# Patient Record
Sex: FEMALE | Race: OTHER | HISPANIC OR LATINO
[De-identification: names, ages, dates, MRNs, and addresses within clinical notes are randomized per-mention and may not be internally consistent; named-entity substitution may affect disease eponyms.]

---

## 2018-03-22 ENCOUNTER — APPOINTMENT (OUTPATIENT)
Dept: RADIOLOGY | Facility: CLINIC | Age: 53
End: 2018-03-22

## 2018-03-22 ENCOUNTER — OUTPATIENT (OUTPATIENT)
Dept: OUTPATIENT SERVICES | Facility: HOSPITAL | Age: 53
LOS: 1 days | End: 2018-03-22
Payer: COMMERCIAL

## 2018-03-22 ENCOUNTER — APPOINTMENT (OUTPATIENT)
Dept: MAMMOGRAPHY | Facility: CLINIC | Age: 53
End: 2018-03-22
Payer: COMMERCIAL

## 2018-03-22 DIAGNOSIS — Z98.89 OTHER SPECIFIED POSTPROCEDURAL STATES: Chronic | ICD-10-CM

## 2018-03-22 DIAGNOSIS — Z00.8 ENCOUNTER FOR OTHER GENERAL EXAMINATION: ICD-10-CM

## 2018-03-22 DIAGNOSIS — Z98.51 TUBAL LIGATION STATUS: Chronic | ICD-10-CM

## 2018-03-22 DIAGNOSIS — Z98.84 BARIATRIC SURGERY STATUS: Chronic | ICD-10-CM

## 2018-03-22 PROCEDURE — 77067 SCR MAMMO BI INCL CAD: CPT | Mod: 26

## 2018-03-22 PROCEDURE — 77063 BREAST TOMOSYNTHESIS BI: CPT

## 2018-03-22 PROCEDURE — 77080 DXA BONE DENSITY AXIAL: CPT

## 2018-03-22 PROCEDURE — 77080 DXA BONE DENSITY AXIAL: CPT | Mod: 26

## 2018-03-22 PROCEDURE — 77067 SCR MAMMO BI INCL CAD: CPT

## 2018-03-22 PROCEDURE — 77063 BREAST TOMOSYNTHESIS BI: CPT | Mod: 26

## 2018-04-13 ENCOUNTER — OUTPATIENT (OUTPATIENT)
Dept: OUTPATIENT SERVICES | Facility: HOSPITAL | Age: 53
LOS: 1 days | End: 2018-04-13
Payer: COMMERCIAL

## 2018-04-13 ENCOUNTER — APPOINTMENT (OUTPATIENT)
Dept: MAMMOGRAPHY | Facility: CLINIC | Age: 53
End: 2018-04-13
Payer: COMMERCIAL

## 2018-04-13 DIAGNOSIS — Z98.84 BARIATRIC SURGERY STATUS: Chronic | ICD-10-CM

## 2018-04-13 DIAGNOSIS — Z98.51 TUBAL LIGATION STATUS: Chronic | ICD-10-CM

## 2018-04-13 DIAGNOSIS — Z00.8 ENCOUNTER FOR OTHER GENERAL EXAMINATION: ICD-10-CM

## 2018-04-13 DIAGNOSIS — Z98.89 OTHER SPECIFIED POSTPROCEDURAL STATES: Chronic | ICD-10-CM

## 2018-04-13 PROCEDURE — 77065 DX MAMMO INCL CAD UNI: CPT

## 2018-04-13 PROCEDURE — 77065 DX MAMMO INCL CAD UNI: CPT | Mod: 26,RT

## 2018-04-20 ENCOUNTER — OUTPATIENT (OUTPATIENT)
Dept: OUTPATIENT SERVICES | Facility: HOSPITAL | Age: 53
LOS: 1 days | End: 2018-04-20
Payer: COMMERCIAL

## 2018-04-20 ENCOUNTER — APPOINTMENT (OUTPATIENT)
Dept: MAMMOGRAPHY | Facility: IMAGING CENTER | Age: 53
End: 2018-04-20
Payer: COMMERCIAL

## 2018-04-20 ENCOUNTER — RESULT REVIEW (OUTPATIENT)
Age: 53
End: 2018-04-20

## 2018-04-20 DIAGNOSIS — Z98.51 TUBAL LIGATION STATUS: Chronic | ICD-10-CM

## 2018-04-20 DIAGNOSIS — Z98.89 OTHER SPECIFIED POSTPROCEDURAL STATES: Chronic | ICD-10-CM

## 2018-04-20 DIAGNOSIS — R92.8 OTHER ABNORMAL AND INCONCLUSIVE FINDINGS ON DIAGNOSTIC IMAGING OF BREAST: ICD-10-CM

## 2018-04-20 DIAGNOSIS — Z98.84 BARIATRIC SURGERY STATUS: Chronic | ICD-10-CM

## 2018-04-20 PROCEDURE — 88305 TISSUE EXAM BY PATHOLOGIST: CPT | Mod: 26

## 2018-04-20 PROCEDURE — 77065 DX MAMMO INCL CAD UNI: CPT | Mod: 26,RT

## 2018-04-20 PROCEDURE — 88305 TISSUE EXAM BY PATHOLOGIST: CPT

## 2018-04-20 PROCEDURE — 19081 BX BREAST 1ST LESION STRTCTC: CPT

## 2018-04-20 PROCEDURE — 19081 BX BREAST 1ST LESION STRTCTC: CPT | Mod: RT

## 2018-04-20 PROCEDURE — A4648: CPT

## 2018-04-20 PROCEDURE — 77065 DX MAMMO INCL CAD UNI: CPT

## 2019-04-04 ENCOUNTER — APPOINTMENT (OUTPATIENT)
Dept: ULTRASOUND IMAGING | Facility: CLINIC | Age: 54
End: 2019-04-04

## 2019-04-04 ENCOUNTER — APPOINTMENT (OUTPATIENT)
Dept: MAMMOGRAPHY | Facility: CLINIC | Age: 54
End: 2019-04-04
Payer: COMMERCIAL

## 2019-04-04 ENCOUNTER — OUTPATIENT (OUTPATIENT)
Dept: OUTPATIENT SERVICES | Facility: HOSPITAL | Age: 54
LOS: 1 days | End: 2019-04-04
Payer: COMMERCIAL

## 2019-04-04 DIAGNOSIS — Z00.8 ENCOUNTER FOR OTHER GENERAL EXAMINATION: ICD-10-CM

## 2019-04-04 DIAGNOSIS — Z98.51 TUBAL LIGATION STATUS: Chronic | ICD-10-CM

## 2019-04-04 DIAGNOSIS — Z98.89 OTHER SPECIFIED POSTPROCEDURAL STATES: Chronic | ICD-10-CM

## 2019-04-04 DIAGNOSIS — Z98.84 BARIATRIC SURGERY STATUS: Chronic | ICD-10-CM

## 2019-04-04 PROCEDURE — G0279: CPT

## 2019-04-04 PROCEDURE — 76641 ULTRASOUND BREAST COMPLETE: CPT | Mod: 26,50

## 2019-04-04 PROCEDURE — 77066 DX MAMMO INCL CAD BI: CPT

## 2019-04-04 PROCEDURE — 76641 ULTRASOUND BREAST COMPLETE: CPT

## 2019-04-04 PROCEDURE — 77066 DX MAMMO INCL CAD BI: CPT | Mod: 26

## 2019-04-04 PROCEDURE — G0279: CPT | Mod: 26

## 2019-04-05 ENCOUNTER — TRANSCRIPTION ENCOUNTER (OUTPATIENT)
Age: 54
End: 2019-04-05

## 2019-05-31 ENCOUNTER — APPOINTMENT (OUTPATIENT)
Dept: GASTROENTEROLOGY | Facility: CLINIC | Age: 54
End: 2019-05-31
Payer: COMMERCIAL

## 2019-05-31 VITALS
SYSTOLIC BLOOD PRESSURE: 122 MMHG | WEIGHT: 205 LBS | HEART RATE: 75 BPM | TEMPERATURE: 98.6 F | DIASTOLIC BLOOD PRESSURE: 80 MMHG | OXYGEN SATURATION: 98 % | HEIGHT: 61 IN | BODY MASS INDEX: 38.71 KG/M2

## 2019-05-31 DIAGNOSIS — Z12.11 ENCOUNTER FOR SCREENING FOR MALIGNANT NEOPLASM OF COLON: ICD-10-CM

## 2019-05-31 PROCEDURE — 99204 OFFICE O/P NEW MOD 45 MIN: CPT

## 2019-05-31 NOTE — HISTORY OF PRESENT ILLNESS
[Heartburn] : stable heartburn [Nausea] : denies nausea [Vomiting] : denies vomiting [Diarrhea] : denies diarrhea [Constipation] : denies constipation [Yellow Skin Or Eyes (Jaundice)] : denies jaundice [Abdominal Pain] : denies abdominal pain [Abdominal Swelling] : denies abdominal swelling [Rectal Pain] : denies rectal pain [Wt Gain ___ Lbs] : recent [unfilled] ~Upound(s) weight gain [GERD] : gastroesophageal reflux disease [Cholelithiasis] : cholelithiasis [Abdominal Surgery] : abdominal surgery [Cholecystectomy] : cholecystectomy [Wt Loss ___ Lbs] : no recent weight loss [Hiatus Hernia] : no hiatus hernia [Peptic Ulcer Disease] : no peptic ulcer disease [Pancreatitis] : no pancreatitis [Kidney Stone] : no kidney stone [Inflammatory Bowel Disease] : no inflammatory bowel disease [Irritable Bowel Syndrome] : no irritable bowel syndrome [Diverticulitis] : no diverticulitis [Alcohol Abuse] : no alcohol abuse [Malignancy] : no malignancy [Appendectomy] : no appendectomy [de-identified] : 53 year old woman presents for a screening colonoscopy. This will be her first colonoscopy. She denies rectal bleeding, melena or hematemesis. She also complains of recent exacerbation of her heartburn and epigastric pain. This occurred after she recently received steroids for her arthritis which she discontinued. She is s/p gastric sleeve surgery for obesity. [de-identified] : laparoscopic sleeve surgery

## 2019-07-17 ENCOUNTER — RX RENEWAL (OUTPATIENT)
Age: 54
End: 2019-07-17

## 2019-07-17 ENCOUNTER — APPOINTMENT (OUTPATIENT)
Dept: GASTROENTEROLOGY | Facility: AMBULATORY MEDICAL SERVICES | Age: 54
End: 2019-07-17
Payer: COMMERCIAL

## 2019-07-17 PROCEDURE — 45380 COLONOSCOPY AND BIOPSY: CPT | Mod: 33

## 2019-07-17 PROCEDURE — 43239 EGD BIOPSY SINGLE/MULTIPLE: CPT | Mod: 59

## 2019-08-13 ENCOUNTER — APPOINTMENT (OUTPATIENT)
Dept: GASTROENTEROLOGY | Facility: CLINIC | Age: 54
End: 2019-08-13
Payer: COMMERCIAL

## 2019-08-13 VITALS
WEIGHT: 208 LBS | OXYGEN SATURATION: 99 % | SYSTOLIC BLOOD PRESSURE: 120 MMHG | DIASTOLIC BLOOD PRESSURE: 70 MMHG | TEMPERATURE: 98.1 F | RESPIRATION RATE: 17 BRPM | HEIGHT: 61 IN | BODY MASS INDEX: 39.27 KG/M2 | HEART RATE: 63 BPM

## 2019-08-13 DIAGNOSIS — K63.5 POLYP OF COLON: ICD-10-CM

## 2019-08-13 PROCEDURE — 99214 OFFICE O/P EST MOD 30 MIN: CPT

## 2019-08-13 RX ORDER — SODIUM SULFATE, POTASSIUM SULFATE, MAGNESIUM SULFATE 17.5; 3.13; 1.6 G/ML; G/ML; G/ML
17.5-3.13-1.6 SOLUTION, CONCENTRATE ORAL
Qty: 1 | Refills: 0 | Status: COMPLETED | COMMUNITY
Start: 2019-05-31 | End: 2019-08-13

## 2019-08-13 NOTE — HISTORY OF PRESENT ILLNESS
[de-identified] : 53 year old woman with GERD. She feels better with the Omeprazole. EGD showed reflux esophagitis and an hiatus hernia. She underwent a screening colonoscopy and had a small hyperplastic polyp removed from the descending colon. She is s/p gastric sleeve surgery.

## 2019-08-13 NOTE — PHYSICAL EXAM
[General Appearance - Alert] : alert [General Appearance - In No Acute Distress] : in no acute distress [Neck Appearance] : the appearance of the neck was normal [Neck Cervical Mass (___cm)] : no neck mass was observed [Thyroid Diffuse Enlargement] : the thyroid was not enlarged [Jugular Venous Distention Increased] : there was no jugular-venous distention [Thyroid Nodule] : there were no palpable thyroid nodules [Auscultation Breath Sounds / Voice Sounds] : lungs were clear to auscultation bilaterally [Heart Sounds] : normal S1 and S2 [Heart Sounds Gallop] : no gallops [Heart Rate And Rhythm] : heart rate was normal and rhythm regular [Murmurs] : no murmurs [Heart Sounds Pericardial Friction Rub] : no pericardial rub [Bowel Sounds] : normal bowel sounds [] : no hepato-splenomegaly [Abdomen Tenderness] : non-tender [Abdomen Soft] : soft [Abdomen Mass (___ Cm)] : no abdominal mass palpated [Cervical Lymph Nodes Enlarged Anterior Bilaterally] : anterior cervical [Cervical Lymph Nodes Enlarged Posterior Bilaterally] : posterior cervical [Supraclavicular Lymph Nodes Enlarged Bilaterally] : supraclavicular [No CVA Tenderness] : no ~M costovertebral angle tenderness [No Spinal Tenderness] : no spinal tenderness

## 2019-08-22 ENCOUNTER — APPOINTMENT (OUTPATIENT)
Dept: INTERNAL MEDICINE | Facility: CLINIC | Age: 54
End: 2019-08-22

## 2020-02-06 RX ORDER — OMEPRAZOLE 40 MG/1
40 CAPSULE, DELAYED RELEASE ORAL
Qty: 30 | Refills: 3 | Status: ACTIVE | COMMUNITY
Start: 2019-07-17 | End: 1900-01-01

## 2020-02-20 ENCOUNTER — APPOINTMENT (OUTPATIENT)
Dept: PLASTIC SURGERY | Facility: CLINIC | Age: 55
End: 2020-02-20
Payer: COMMERCIAL

## 2020-02-20 VITALS — BODY MASS INDEX: 37.17 KG/M2 | WEIGHT: 202 LBS | HEIGHT: 62 IN

## 2020-02-20 DIAGNOSIS — E65 LOCALIZED ADIPOSITY: ICD-10-CM

## 2020-02-20 DIAGNOSIS — M79.3 PANNICULITIS, UNSPECIFIED: ICD-10-CM

## 2020-02-20 DIAGNOSIS — E88.1 LIPODYSTROPHY, NOT ELSEWHERE CLASSIFIED: ICD-10-CM

## 2020-02-20 PROCEDURE — 99204 OFFICE O/P NEW MOD 45 MIN: CPT

## 2020-02-22 PROBLEM — E65 ABDOMINAL PANNUS: Status: ACTIVE | Noted: 2020-02-22

## 2020-02-22 PROBLEM — M79.3 PANNICULITIS: Status: ACTIVE | Noted: 2020-02-22

## 2020-02-22 PROBLEM — E88.1 LIPODYSTROPHY: Status: ACTIVE | Noted: 2020-02-22

## 2020-04-26 ENCOUNTER — MESSAGE (OUTPATIENT)
Age: 55
End: 2020-04-26

## 2020-07-23 ENCOUNTER — APPOINTMENT (OUTPATIENT)
Dept: MAMMOGRAPHY | Facility: CLINIC | Age: 55
End: 2020-07-23
Payer: COMMERCIAL

## 2020-07-23 ENCOUNTER — APPOINTMENT (OUTPATIENT)
Dept: ULTRASOUND IMAGING | Facility: CLINIC | Age: 55
End: 2020-07-23
Payer: COMMERCIAL

## 2020-07-23 ENCOUNTER — OUTPATIENT (OUTPATIENT)
Dept: OUTPATIENT SERVICES | Facility: HOSPITAL | Age: 55
LOS: 1 days | End: 2020-07-23
Payer: COMMERCIAL

## 2020-07-23 DIAGNOSIS — Z98.84 BARIATRIC SURGERY STATUS: Chronic | ICD-10-CM

## 2020-07-23 DIAGNOSIS — Z98.89 OTHER SPECIFIED POSTPROCEDURAL STATES: Chronic | ICD-10-CM

## 2020-07-23 DIAGNOSIS — Z98.51 TUBAL LIGATION STATUS: Chronic | ICD-10-CM

## 2020-07-23 DIAGNOSIS — Z12.31 ENCOUNTER FOR SCREENING MAMMOGRAM FOR MALIGNANT NEOPLASM OF BREAST: ICD-10-CM

## 2020-07-23 PROCEDURE — 77063 BREAST TOMOSYNTHESIS BI: CPT

## 2020-07-23 PROCEDURE — 77067 SCR MAMMO BI INCL CAD: CPT | Mod: 26

## 2020-07-23 PROCEDURE — 77063 BREAST TOMOSYNTHESIS BI: CPT | Mod: 26

## 2020-07-23 PROCEDURE — 77067 SCR MAMMO BI INCL CAD: CPT

## 2020-07-23 PROCEDURE — 76641 ULTRASOUND BREAST COMPLETE: CPT

## 2020-07-23 PROCEDURE — 76641 ULTRASOUND BREAST COMPLETE: CPT | Mod: 26,50

## 2020-12-21 PROBLEM — Z12.11 ENCOUNTER FOR SCREENING COLONOSCOPY: Status: RESOLVED | Noted: 2019-05-31 | Resolved: 2020-12-21

## 2021-02-17 ENCOUNTER — NON-APPOINTMENT (OUTPATIENT)
Age: 56
End: 2021-02-17

## 2021-03-03 ENCOUNTER — APPOINTMENT (OUTPATIENT)
Dept: BARIATRICS | Facility: CLINIC | Age: 56
End: 2021-03-03
Payer: COMMERCIAL

## 2021-03-03 VITALS
BODY MASS INDEX: 37.97 KG/M2 | DIASTOLIC BLOOD PRESSURE: 70 MMHG | TEMPERATURE: 97.4 F | HEART RATE: 91 BPM | SYSTOLIC BLOOD PRESSURE: 120 MMHG | HEIGHT: 62 IN | WEIGHT: 206.35 LBS | OXYGEN SATURATION: 99 %

## 2021-03-03 DIAGNOSIS — R63.5 ABNORMAL WEIGHT GAIN: ICD-10-CM

## 2021-03-03 DIAGNOSIS — K59.00 CONSTIPATION, UNSPECIFIED: ICD-10-CM

## 2021-03-03 DIAGNOSIS — R14.0 ABDOMINAL DISTENSION (GASEOUS): ICD-10-CM

## 2021-03-03 DIAGNOSIS — Z90.49 ACQUIRED ABSENCE OF OTHER SPECIFIED PARTS OF DIGESTIVE TRACT: ICD-10-CM

## 2021-03-03 PROCEDURE — 99214 OFFICE O/P EST MOD 30 MIN: CPT

## 2021-03-03 PROCEDURE — 99072 ADDL SUPL MATRL&STAF TM PHE: CPT

## 2021-03-03 RX ORDER — TERBINAFINE HYDROCHLORIDE 250 MG/1
250 TABLET ORAL
Refills: 0 | Status: ACTIVE | COMMUNITY

## 2021-03-04 NOTE — PHYSICAL EXAM
[Obese, well nourished, in no acute distress] : obese, well nourished, in no acute distress [Normal] : affect appropriate [de-identified] : normoactive bowel sounds, soft and non tender, no hepatosplenomegaly or masses appreciated.

## 2021-03-04 NOTE — REVIEW OF SYSTEMS
[Constipation] : constipation [Negative] : Heme/Lymph [Abdominal Pain] : abdominal pain [Reflux/Heartburn] : reflux/heartburn [Recent Change In Weight] : ~T recent weight change [Fever] : no fever [Chills] : no chills [Night Sweats] : no night sweats [Fatigue] : no fatigue [Dysphagia] : no dysphagia [Loss of Hearing] : no loss of hearing [Hoarseness] : no hoarseness [Chest Pain] : no chest pain [Palpitations] : no palpitations [Lower Ext Edema] : no lower extremity edema [Shortness Of Breath] : no shortness of breath [Wheezing] : no wheezing [Vomiting] : no vomiting [Diarrhea] : no diarrhea [Dysuria] : no dysuria [Incontinence] : no incontinence [Joint Pain] : no joint pain [Joint Stiffness] : no joint stiffness [Skin Rash] : no skin rash [Skin Wound] : no skin wound [FreeTextEntry2] : weight gain since last visit.  [FreeTextEntry7] : + gas / flatulence

## 2021-03-04 NOTE — ASSESSMENT
[FreeTextEntry1] : 55 year old F  s/p lap sleeve gastrectomy and intra- op EGD. Weight gain since last visit on 2/11/2021. Current weight is 206 lbs. Maladaptive eating patterns. History of Gas/ Flatulence / GERD symptoms\par \par \par Will continue multivitamins and continue protein and liquid intake as instructed.\par Call for any questions or concerns.\par \par Nutritional counseling has been provided. The patient is encouraged to remain calorie conscious and continue a low fat, low carbohydrate, protein focus diet. Pt encouraged to participate in a daily exercise regimen incorporating cardio and  strength training.\par \par Discussed and reviewed recent labs including vitamin levels  with patient. TSH - 0.04 Plan to follow up with Endocrinologist to discuss decreasing levothyroxine - currently taking 250 mcg per day.\par \par Most recent Upper EGD - Dr. Roberts 2019 - acute / chronic inflammation and intestinal metaplasia- may represent Fallon's Esophagus . \par \par Pt was  advised to follow up with GI 2 years after procedure ( 2021) Plan to schedule a follow up . Pt advised to follow a 3 meal a day regimen. Pt advised to continue daily PPI / start a daily probiotic / avoid dairy - cheese , milk / fried foods/ spicy foods. May try either Gas- X or Phazyme OTC.  \par \par Discussed and reviewed constipation remedies with patient. \par \par Plan to schedule a one on one telemedicine visit with Annmarie GARCIA.\par \par Return to office in 6-8 weeks or earlier if any concerns.\par \par I spent an extensive amount of time with patient prior to visit  and after visit reviewing chart. \par

## 2021-03-04 NOTE — HISTORY OF PRESENT ILLNESS
[Procedure: ___] : Procedure performed: [unfilled]  [Date of Surgery: ___] : Date of Surgery:   [unfilled] [Surgeon Name:   ___] : Surgeon Name: Dr. DAMON [Pre-Op Weight ___] : Pre-op weight was [unfilled] lbs [de-identified] : 55 year old F  s/p lap sleeve gastrectomy and intra- op EGD. Weight gain since last visit on 2/11/2021. Current weight is 206 lbs. Pt is here to get back on track - history of maladaptive eating patterns - consuming sweets, skipping breakfast regularly, snacking between meals,consuming liquid calories. Pt has reports onset of  gas and flatulence this past Summer. Pt states she does consume fried foods/ dairy - cheese and milk during the week. Occasional nausea. No vomiting.  No issues with textured foods.  Consuming 3 meals per day. Patient is taking vitamin supplements as directed.  + constipation. No diarrhea . Occasional reflux symptoms. 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 Pt is planning to restart exercising.  [de-identified] : LAPAROSCOPIC CHOLECYSTECTOMY         DOS : 10/28/2018           SURGEON : DR PENNY

## 2021-03-19 ENCOUNTER — APPOINTMENT (OUTPATIENT)
Dept: GASTROENTEROLOGY | Facility: CLINIC | Age: 56
End: 2021-03-19

## 2021-03-19 LAB
25(OH)D3 SERPL-MCNC: 37.5 NG/ML
ALBUMIN SERPL ELPH-MCNC: 4.2 G/DL
ALP BLD-CCNC: 128 U/L
ALT SERPL-CCNC: 12 U/L
ANION GAP SERPL CALC-SCNC: 14 MMOL/L
AST SERPL-CCNC: 18 U/L
BASOPHILS # BLD AUTO: 0.05 K/UL
BASOPHILS NFR BLD AUTO: 0.9 %
BILIRUB SERPL-MCNC: 0.4 MG/DL
BUN SERPL-MCNC: 17 MG/DL
CALCIUM SERPL-MCNC: 9.9 MG/DL
CHLORIDE SERPL-SCNC: 106 MMOL/L
CHOLEST SERPL-MCNC: 161 MG/DL
CO2 SERPL-SCNC: 22 MMOL/L
CREAT SERPL-MCNC: 0.7 MG/DL
EOSINOPHIL # BLD AUTO: 0.09 K/UL
EOSINOPHIL NFR BLD AUTO: 1.5 %
ESTIMATED AVERAGE GLUCOSE: 108 MG/DL
FERRITIN SERPL-MCNC: 64 NG/ML
FOLATE SERPL-MCNC: 5.2 NG/ML
GLUCOSE SERPL-MCNC: 79 MG/DL
HBA1C MFR BLD HPLC: 5.4 %
HCT VFR BLD CALC: 40.3 %
HDLC SERPL-MCNC: 55 MG/DL
HGB BLD-MCNC: 12.9 G/DL
IMM GRANULOCYTES NFR BLD AUTO: 0.2 %
IRON SATN MFR SERPL: 28 %
IRON SERPL-MCNC: 94 UG/DL
LDLC SERPL CALC-MCNC: 85 MG/DL
LYMPHOCYTES # BLD AUTO: 1.77 K/UL
LYMPHOCYTES NFR BLD AUTO: 30.3 %
MAN DIFF?: NORMAL
MCHC RBC-ENTMCNC: 29.4 PG
MCHC RBC-ENTMCNC: 32 GM/DL
MCV RBC AUTO: 91.8 FL
MONOCYTES # BLD AUTO: 0.71 K/UL
MONOCYTES NFR BLD AUTO: 12.2 %
NEUTROPHILS # BLD AUTO: 3.21 K/UL
NEUTROPHILS NFR BLD AUTO: 54.9 %
NONHDLC SERPL-MCNC: 106 MG/DL
PLATELET # BLD AUTO: 318 K/UL
POTASSIUM SERPL-SCNC: 4.4 MMOL/L
PROT SERPL-MCNC: 7.6 G/DL
RBC # BLD: 4.39 M/UL
RBC # FLD: 13 %
SODIUM SERPL-SCNC: 141 MMOL/L
T4 FREE SERPL-MCNC: 1.9 NG/DL
TIBC SERPL-MCNC: 338 UG/DL
TRIGL SERPL-MCNC: 106 MG/DL
TSH SERPL-ACNC: 0.04 UIU/ML
UIBC SERPL-MCNC: 244 UG/DL
VIT A SERPL-MCNC: 39.5 UG/DL
VIT B1 SERPL-MCNC: 117.9 NMOL/L
VIT B12 SERPL-MCNC: 403 PG/ML
VIT B2 SERPL-MCNC: 210 UG/L
VIT B6 SERPL-MCNC: 5.9 UG/L
WBC # FLD AUTO: 5.84 K/UL
ZINC SERPL-MCNC: 97 UG/DL

## 2021-04-11 ENCOUNTER — TRANSCRIPTION ENCOUNTER (OUTPATIENT)
Age: 56
End: 2021-04-11

## 2021-04-21 ENCOUNTER — TRANSCRIPTION ENCOUNTER (OUTPATIENT)
Age: 56
End: 2021-04-21

## 2021-06-01 ENCOUNTER — APPOINTMENT (OUTPATIENT)
Dept: ENDOCRINOLOGY | Facility: CLINIC | Age: 56
End: 2021-06-01
Payer: COMMERCIAL

## 2021-06-01 VITALS
SYSTOLIC BLOOD PRESSURE: 143 MMHG | HEART RATE: 85 BPM | WEIGHT: 205 LBS | OXYGEN SATURATION: 98 % | BODY MASS INDEX: 37.73 KG/M2 | DIASTOLIC BLOOD PRESSURE: 86 MMHG | HEIGHT: 62 IN

## 2021-06-01 PROCEDURE — 99072 ADDL SUPL MATRL&STAF TM PHE: CPT

## 2021-06-01 PROCEDURE — 99204 OFFICE O/P NEW MOD 45 MIN: CPT

## 2021-06-22 ENCOUNTER — APPOINTMENT (OUTPATIENT)
Dept: GASTROENTEROLOGY | Facility: CLINIC | Age: 56
End: 2021-06-22

## 2021-06-30 ENCOUNTER — APPOINTMENT (OUTPATIENT)
Dept: BARIATRICS | Facility: CLINIC | Age: 56
End: 2021-06-30

## 2021-07-17 ENCOUNTER — LABORATORY RESULT (OUTPATIENT)
Age: 56
End: 2021-07-17

## 2021-08-10 ENCOUNTER — TRANSCRIPTION ENCOUNTER (OUTPATIENT)
Age: 56
End: 2021-08-10

## 2021-09-22 ENCOUNTER — RX RENEWAL (OUTPATIENT)
Age: 56
End: 2021-09-22

## 2021-10-18 ENCOUNTER — RX RENEWAL (OUTPATIENT)
Age: 56
End: 2021-10-18

## 2021-11-10 ENCOUNTER — OUTPATIENT (OUTPATIENT)
Dept: OUTPATIENT SERVICES | Facility: HOSPITAL | Age: 56
LOS: 1 days | End: 2021-11-10
Payer: COMMERCIAL

## 2021-11-10 ENCOUNTER — APPOINTMENT (OUTPATIENT)
Dept: RADIOLOGY | Facility: CLINIC | Age: 56
End: 2021-11-10
Payer: COMMERCIAL

## 2021-11-10 ENCOUNTER — APPOINTMENT (OUTPATIENT)
Dept: ULTRASOUND IMAGING | Facility: CLINIC | Age: 56
End: 2021-11-10
Payer: COMMERCIAL

## 2021-11-10 ENCOUNTER — APPOINTMENT (OUTPATIENT)
Dept: MAMMOGRAPHY | Facility: CLINIC | Age: 56
End: 2021-11-10
Payer: COMMERCIAL

## 2021-11-10 DIAGNOSIS — Z98.51 TUBAL LIGATION STATUS: Chronic | ICD-10-CM

## 2021-11-10 DIAGNOSIS — Z98.84 BARIATRIC SURGERY STATUS: Chronic | ICD-10-CM

## 2021-11-10 DIAGNOSIS — Z98.89 OTHER SPECIFIED POSTPROCEDURAL STATES: Chronic | ICD-10-CM

## 2021-11-10 DIAGNOSIS — Z13.820 ENCOUNTER FOR SCREENING FOR OSTEOPOROSIS: ICD-10-CM

## 2021-11-10 PROCEDURE — 76641 ULTRASOUND BREAST COMPLETE: CPT | Mod: 26,50

## 2021-11-10 PROCEDURE — 77063 BREAST TOMOSYNTHESIS BI: CPT

## 2021-11-10 PROCEDURE — 77080 DXA BONE DENSITY AXIAL: CPT | Mod: 26

## 2021-11-10 PROCEDURE — 76641 ULTRASOUND BREAST COMPLETE: CPT

## 2021-11-10 PROCEDURE — 77063 BREAST TOMOSYNTHESIS BI: CPT | Mod: 26

## 2021-11-10 PROCEDURE — 77067 SCR MAMMO BI INCL CAD: CPT | Mod: 26

## 2021-11-10 PROCEDURE — 77080 DXA BONE DENSITY AXIAL: CPT

## 2021-11-10 PROCEDURE — 77067 SCR MAMMO BI INCL CAD: CPT

## 2021-11-17 ENCOUNTER — RX RENEWAL (OUTPATIENT)
Age: 56
End: 2021-11-17

## 2021-11-19 ENCOUNTER — LABORATORY RESULT (OUTPATIENT)
Age: 56
End: 2021-11-19

## 2021-11-19 ENCOUNTER — APPOINTMENT (OUTPATIENT)
Dept: ENDOCRINOLOGY | Facility: CLINIC | Age: 56
End: 2021-11-19
Payer: COMMERCIAL

## 2021-11-19 VITALS
WEIGHT: 205 LBS | HEART RATE: 88 BPM | SYSTOLIC BLOOD PRESSURE: 130 MMHG | DIASTOLIC BLOOD PRESSURE: 89 MMHG | OXYGEN SATURATION: 98 % | BODY MASS INDEX: 37.73 KG/M2 | HEIGHT: 62 IN

## 2021-11-19 PROCEDURE — 99214 OFFICE O/P EST MOD 30 MIN: CPT | Mod: 25

## 2021-11-19 PROCEDURE — 36415 COLL VENOUS BLD VENIPUNCTURE: CPT

## 2021-12-03 ENCOUNTER — APPOINTMENT (OUTPATIENT)
Dept: ENDOCRINOLOGY | Facility: CLINIC | Age: 56
End: 2021-12-03
Payer: COMMERCIAL

## 2021-12-03 LAB
25(OH)D3 SERPL-MCNC: 33.5 NG/ML
ALBUMIN MFR SERPL ELPH: 51.3 %
ALBUMIN SERPL-MCNC: 3.8 G/DL
ALBUMIN/GLOB SERPL: 1 RATIO
ALBUPE: 14.5 %
ALPHA1 GLOB MFR SERPL ELPH: 4.2 %
ALPHA1 GLOB SERPL ELPH-MCNC: 0.3 G/DL
ALPHA1UPE: 37.1 %
ALPHA2 GLOB MFR SERPL ELPH: 10.7 %
ALPHA2 GLOB SERPL ELPH-MCNC: 0.8 G/DL
ALPHA2UPE: 23.7 %
B-GLOBULIN MFR SERPL ELPH: 15.6 %
B-GLOBULIN SERPL ELPH-MCNC: 1.2 G/DL
BETAUPE: 16.2 %
CALCIUM SERPL-MCNC: 9.3 MG/DL
CELIAC DISEASE INTERPRETATION: NORMAL
CELIAC GENE PAIRS PRESENT: YES
COLLAGEN CTX SERPL-MCNC: 380 PG/ML
COLLAGEN NTX UR-SCNC: 1839 NMOL BCE
COLLAGEN NTX/CREAT UR-SRTO: 200
CREAT 24H UR-MCNC: NORMAL G/24 H
CREAT UR-MCNC: 104 MG/DL
CREATININE UR (MAYO): 115 MG/DL
DQ ALPHA 1: NORMAL
DQ BETA 1: NORMAL
GAMMA GLOB FLD ELPH-MCNC: 1.4 G/DL
GAMMA GLOB MFR SERPL ELPH: 18.2 %
GAMMAUPE: 8.5 %
IGA 24H UR QL IFE: NORMAL
IMMUNOGLOBULIN A (IGA): 522 MG/DL
INTERPRETATION SERPL IEP-IMP: NORMAL
INTERPRETIVE GUIDE:: NORMAL
KAPPA LC 24H UR QL: NORMAL
PARATHYROID HORMONE INTACT: 40 PG/ML
PROT PATTERN 24H UR ELPH-IMP: NORMAL
PROT SERPL-MCNC: 7.5 G/DL
PROT SERPL-MCNC: 7.5 G/DL
PROT UR-MCNC: 9 MG/DL
PROT UR-MCNC: 9 MG/DL
T4 FREE SERPL-MCNC: 2.4 NG/DL
TSH SERPL-ACNC: 0.15 UIU/ML
VIT B12 SERPL-MCNC: 465 PG/ML

## 2021-12-03 PROCEDURE — 99442: CPT

## 2021-12-03 RX ORDER — LEVOTHYROXINE SODIUM 0.2 MG/1
200 TABLET ORAL
Qty: 30 | Refills: 0 | Status: DISCONTINUED | COMMUNITY
Start: 2021-06-01 | End: 2021-12-03

## 2021-12-19 ENCOUNTER — RX RENEWAL (OUTPATIENT)
Age: 56
End: 2021-12-19

## 2022-01-19 ENCOUNTER — TRANSCRIPTION ENCOUNTER (OUTPATIENT)
Age: 57
End: 2022-01-19

## 2022-03-18 ENCOUNTER — LABORATORY RESULT (OUTPATIENT)
Age: 57
End: 2022-03-18

## 2022-03-25 ENCOUNTER — APPOINTMENT (OUTPATIENT)
Dept: ENDOCRINOLOGY | Facility: CLINIC | Age: 57
End: 2022-03-25
Payer: COMMERCIAL

## 2022-03-25 VITALS
DIASTOLIC BLOOD PRESSURE: 83 MMHG | WEIGHT: 216 LBS | BODY MASS INDEX: 39.51 KG/M2 | OXYGEN SATURATION: 98 % | HEART RATE: 75 BPM | SYSTOLIC BLOOD PRESSURE: 122 MMHG

## 2022-03-25 PROCEDURE — 99214 OFFICE O/P EST MOD 30 MIN: CPT

## 2022-03-25 NOTE — PHYSICAL EXAM
[Alert] : alert [Well Nourished] : well nourished [Obese] : obese [No Acute Distress] : no acute distress [EOMI] : extra ocular movement intact [No Proptosis] : no proptosis [No Lid Lag] : no lid lag [Normal Hearing] : hearing was normal [Thyroid Not Enlarged] : the thyroid was not enlarged [No Respiratory Distress] : no respiratory distress [No Accessory Muscle Use] : no accessory muscle use [Normal Rate] : heart rate was normal [No Stigmata of Cushings Syndrome] : no stigmata of Cushings Syndrome [Normal Gait] : normal gait [No Rash] : no rash [No Motor Deficits] : the motor exam was normal [No Tremors] : no tremors [Oriented x3] : oriented to person, place, and time [Normal Affect] : the affect was normal [Normal Insight/Judgement] : insight and judgment were intact [Normal Mood] : the mood was normal [de-identified] : BMI 39

## 2022-03-25 NOTE — HISTORY OF PRESENT ILLNESS
[FreeTextEntry1] : Ms. Molina is presenting for follow up care for her hypothyroidism and osteoporosis.  \par \par 56 year old F with PMH of s/p lap sleeve gastrectomy in 2014 arthritis, hypothyroidism and osteoporosis. 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 Pt was previously 275mcg pre bariatric surgery, now on 200mcg. No MI or stroke. Pre op weight 306, lowest weight was 177lbs in feb 2016 and then pt starting eating poorly. Pt is weight stable at 205lbs. She also stopped exercising. ROS negative diarrhea, increased bowel movements, blurry vision, heat intolerance, palpitations, anxiety. \par TSH level in feb 2021 was low, 0.04 with a FT4 of 1.9. Repeat August improved to TSH 0.24, FT4 1.8. Patient was supposed to repeat labs after 6 weeks but notes she forgot. \par Today TSH 5.7 (increased from 0.15) with dose decreased from 200mcg to 150mcg. \par No h/o thyroid sonogram. \par \par #Osteoporosis \par Fracture history: None\par Family history: No parental history of hip fracture\par Prior Treatment: None \par Prior HRT: None \par Osteoporosis diagnosed in Nov 2021 on routine bone density significant for T-scores of -1.0 at the lumbar spine, -2.5 at the femoral neck\par Bone scan 2018: Femoral neck: -1.4, Total hip: -0.7, Spine: 0.0. \par Patient currently on fosamax weekly Started Dec 2021. Tolerating well. \par Vit D wnl, calcium wnl

## 2022-03-25 NOTE — DATA REVIEWED
[FreeTextEntry1] : All pertinent labs and imaging reviewed from patient's chart. \par \par EXAM:  XR BONE DENSITY AXIAL  \par PROCEDURE DATE:  11/10/2021  \par INTERPRETATION:  Clinical indication: Osteopenia. Screening for osteoporosis.\par \par Thank you for referring your patient for bone densitometry on the Lunar [employer] machine.  The results are expressed as a T-score, or the standard deviation from the mean young normal value, which is the basis for the WHO diagnostic criteria for bone density. Please note that the criteria have not been validated for males or premenopausal females.\par \par COMPARISON: Bone density dated 11/10/2021.\par \par RESULTS:\par \par Spine: -1.0, Normal; -10.2% is compared with 2018.\par Femoral neck: -2.5 , osteoporosis.\par Total hip: -1.7 , osteopenia; -13.2% is compared with 2018.\par \par IMPRESSION: Osteoporosis.\par \par FRACTURE RISK: The risk of fracture is above average.\par \par TREATMENT RECOMMENDATIONS:  Based on NOF treatment guidelines medical therapy is recommended at this time.\par \par \par EXAM:  XR BONE DENSITY AXIAL PROCEDURE DATE:  03/22/2018  \par COMPARISON: None.\par \par RESULTS:\par Femoral neck: -1.4,    .\par Total hip: -0.7,    .\par Spine: 0.0, \par \par The 10 year probability of an osteoporotic fracture is 2.2%\par The 10 year probability hip fracture 0.2%.\par \par IMPRESSION: \par Osteopenia

## 2022-03-25 NOTE — ASSESSMENT
[Weight Loss] : weight loss [Bisphosphonates] : The patient was instructed to take bisphosphonates on an empty stomach with a full glass of water,and wait at least 30 minutes before eating or lying down [Levothyroxine] : The patient was instructed to take Levothyroxine on an empty stomach, separate from vitamins, and wait at least 30 minutes before eating [FreeTextEntry1] : 56 year old F s/p lap sleeve gastrectomy in 2014 presenting for follow up of her hypothyroidism and newly diagnosed osteoporosis. \par \par 1. Hypothyroidism \par TSH now 5.70, FT4 1.4 March 2022 on LT4 150mcg. \par Increase to 2 tabs on sunday. \par Repeat labs in 6 weeks. \par \par 2. Osteoporosis. \par She has no history of fragility fracture. Her bone density is -2.5 at hip and she has 2 risk factors for fracture.\par Continue weekly Fosamax (started Dec 2021)\par \par Patient verbalized understanding of the above. All questions were answered to patient's satisfaction.\par Dispo: Patient will follow up in 6 months. Discuss labs over the phone in 6 weeks.

## 2022-06-29 ENCOUNTER — NON-APPOINTMENT (OUTPATIENT)
Age: 57
End: 2022-06-29

## 2022-07-07 ENCOUNTER — RX RENEWAL (OUTPATIENT)
Age: 57
End: 2022-07-07

## 2022-07-26 ENCOUNTER — RX RENEWAL (OUTPATIENT)
Age: 57
End: 2022-07-26

## 2022-09-13 ENCOUNTER — RX RENEWAL (OUTPATIENT)
Age: 57
End: 2022-09-13

## 2022-10-05 ENCOUNTER — RX RENEWAL (OUTPATIENT)
Age: 57
End: 2022-10-05

## 2022-11-10 ENCOUNTER — RESULT REVIEW (OUTPATIENT)
Age: 57
End: 2022-11-10

## 2022-11-16 ENCOUNTER — APPOINTMENT (OUTPATIENT)
Dept: MAMMOGRAPHY | Facility: CLINIC | Age: 57
End: 2022-11-16

## 2022-11-16 ENCOUNTER — OUTPATIENT (OUTPATIENT)
Dept: OUTPATIENT SERVICES | Facility: HOSPITAL | Age: 57
LOS: 1 days | End: 2022-11-16
Payer: COMMERCIAL

## 2022-11-16 ENCOUNTER — APPOINTMENT (OUTPATIENT)
Dept: ULTRASOUND IMAGING | Facility: CLINIC | Age: 57
End: 2022-11-16

## 2022-11-16 DIAGNOSIS — Z98.51 TUBAL LIGATION STATUS: Chronic | ICD-10-CM

## 2022-11-16 DIAGNOSIS — Z00.8 ENCOUNTER FOR OTHER GENERAL EXAMINATION: ICD-10-CM

## 2022-11-16 DIAGNOSIS — Z98.89 OTHER SPECIFIED POSTPROCEDURAL STATES: Chronic | ICD-10-CM

## 2022-11-16 DIAGNOSIS — Z98.84 BARIATRIC SURGERY STATUS: Chronic | ICD-10-CM

## 2022-11-16 PROCEDURE — 76641 ULTRASOUND BREAST COMPLETE: CPT | Mod: 26,50

## 2022-11-16 PROCEDURE — 77063 BREAST TOMOSYNTHESIS BI: CPT

## 2022-11-16 PROCEDURE — 77063 BREAST TOMOSYNTHESIS BI: CPT | Mod: 26

## 2022-11-16 PROCEDURE — 76641 ULTRASOUND BREAST COMPLETE: CPT

## 2022-11-16 PROCEDURE — 77067 SCR MAMMO BI INCL CAD: CPT | Mod: 26

## 2022-11-16 PROCEDURE — 77067 SCR MAMMO BI INCL CAD: CPT

## 2022-12-01 ENCOUNTER — LABORATORY RESULT (OUTPATIENT)
Age: 57
End: 2022-12-01

## 2022-12-05 ENCOUNTER — APPOINTMENT (OUTPATIENT)
Dept: ENDOCRINOLOGY | Facility: CLINIC | Age: 57
End: 2022-12-05

## 2022-12-05 VITALS
DIASTOLIC BLOOD PRESSURE: 81 MMHG | SYSTOLIC BLOOD PRESSURE: 136 MMHG | HEIGHT: 62 IN | BODY MASS INDEX: 41.41 KG/M2 | HEART RATE: 83 BPM | OXYGEN SATURATION: 99 % | WEIGHT: 225 LBS

## 2022-12-05 PROCEDURE — 99214 OFFICE O/P EST MOD 30 MIN: CPT

## 2022-12-06 NOTE — HISTORY OF PRESENT ILLNESS
[FreeTextEntry1] : Ms. Molina is presenting for follow up care for her hypothyroidism and osteoporosis.  \par \par 57 year old F with PMH of s/p lap sleeve gastrectomy in 2014 arthritis, hypothyroidism and osteoporosis. 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 Pt was previously 275mcg pre bariatric surgery, now on 200mcg. No MI or stroke. Pre op weight 306, lowest weight was 177lbs in feb 2016 and then pt starting eating poorly. Pt is weight stable at 205lbs. She also stopped exercising. ROS negative diarrhea, increased bowel movements, blurry vision, heat intolerance, palpitations, anxiety. \par TSH level in feb 2021 was low, 0.04 with a FT4 of 1.9. Repeat August improved to TSH 0.24, FT4 1.8. \par Today TSH 5.7 (increased from 0.15) with dose decreased from 200mcg to 150mcg QD. \par No h/o thyroid sonogram. \par Dec 2022 TSH 0.41 FT4 1.7 on LT4 150mcg 8x/week \par \par #Osteoporosis \par Fracture history: None\par Family history: No parental history of hip fracture\par Prior Treatment: None \par Prior HRT: None \par Osteoporosis diagnosed in Nov 2021 on routine bone density significant for T-scores of -1.0 at the lumbar spine, -2.5 at the femoral neck\par Bone scan 2018: Femoral neck: -1.4, Total hip: -0.7, Spine: 0.0. \par Patient currently on fosamax weekly Started Dec 2021. Tolerating well. \par Vit D wnl, calcium wnl

## 2022-12-06 NOTE — PHYSICAL EXAM
[Alert] : alert [Well Nourished] : well nourished [Obese] : obese [No Acute Distress] : no acute distress [EOMI] : extra ocular movement intact [No Proptosis] : no proptosis [No Lid Lag] : no lid lag [Normal Hearing] : hearing was normal [Thyroid Not Enlarged] : the thyroid was not enlarged [No Respiratory Distress] : no respiratory distress [No Accessory Muscle Use] : no accessory muscle use [Normal Rate] : heart rate was normal [No Stigmata of Cushings Syndrome] : no stigmata of Cushings Syndrome [Normal Gait] : normal gait [No Rash] : no rash [No Motor Deficits] : the motor exam was normal [No Tremors] : no tremors [Oriented x3] : oriented to person, place, and time [Normal Affect] : the affect was normal [Normal Insight/Judgement] : insight and judgment were intact [Normal Mood] : the mood was normal [de-identified] : BMI 39

## 2022-12-06 NOTE — ASSESSMENT
[Weight Loss] : weight loss [Bisphosphonates] : The patient was instructed to take bisphosphonates on an empty stomach with a full glass of water,and wait at least 30 minutes before eating or lying down [Levothyroxine] : The patient was instructed to take Levothyroxine on an empty stomach, separate from vitamins, and wait at least 30 minutes before eating [FreeTextEntry1] : 57 year old F s/p lap sleeve gastrectomy in 2014 presenting for follow up of her hypothyroidism and Osteoporosis. \par \par 1. Hypothyroidism \par TSH now 0.41,  FT4 1.7 Dec 2022\par Decrease to LT4 150mcg 1.5 tabs on sunday, 1 tab Mon thru sat, 1.5 tab on sunday. \par Repeat labs in 6 months. \par \par 2. Osteoporosis. \par She has no history of fragility fracture. Her bone density is -2.5 at hip and she has 2 risk factors for fracture.\par Continue weekly Fosamax (started Dec 2021)\par DEXA scan due Nov 2023\par \par Patient verbalized understanding of the above. All questions were answered to patient's satisfaction.\par Dispo: Patient will follow up in 6 months.

## 2023-01-11 ENCOUNTER — RX RENEWAL (OUTPATIENT)
Age: 58
End: 2023-01-11

## 2023-01-30 ENCOUNTER — APPOINTMENT (OUTPATIENT)
Dept: BARIATRICS/WEIGHT MGMT | Facility: CLINIC | Age: 58
End: 2023-01-30
Payer: COMMERCIAL

## 2023-01-30 VITALS — HEIGHT: 62 IN | BODY MASS INDEX: 43.24 KG/M2 | WEIGHT: 235 LBS

## 2023-01-30 DIAGNOSIS — E66.9 OBESITY, UNSPECIFIED: ICD-10-CM

## 2023-01-30 DIAGNOSIS — J45.909 UNSPECIFIED ASTHMA, UNCOMPLICATED: ICD-10-CM

## 2023-01-30 PROCEDURE — 99205 OFFICE O/P NEW HI 60 MIN: CPT | Mod: 95

## 2023-01-30 NOTE — REASON FOR VISIT
[Initial Evaluation] : an initial evaluation [Home] : at home, [unfilled] , at the time of the visit. [Medical Office: ([facility])___] : at the medical office located in

## 2023-01-30 NOTE — HISTORY OF PRESENT ILLNESS
[FreeTextEntry1] : Bariatric surgery history: sleeve 2014 eGma\par Obesity co-morbidities: hypothyroidism, arthritis, allergies - with pet dander\par Comorbidities improved or resolved: none\par Anti-obesity medications: none\par Obesity medication side effects: none\par \par PATIENT WAS NOTIFIED THAT ANYTHING WE DISCUSSED IN OUR MEETING TODAY MAY BE REFLECTED IN WRITING IN THE VISIT NOTE WHICH WILL BE AVAILABLE TO OTHER MEDICAL PROVIDERS TO REVIEW AS PART OF ROUTINE PATIENT CARE.  PATIENT VERBALLY AGREED. \par \par Ms. FELIPA ZELAYA is a 57 year year old female who presents for evaluation and treatment of Class 3 obesity. \par \par Obesity related co- morbidities: hypothyroidism, arthritis  - in knees, back, asthma\par Arthritis - worse with weight gain. 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  +stress eating as well.   Take out 4-5 times a week. \par \par Obesity began in teens.  Weight gain has occurred with:  In 20s up to 250, and 30s up to 300, then after keshawn surgery down to 175, now - at that time went to the gym.  Then covid hit - switched to working from home and \par then gradually increased.  With 1st pregnancy had thyroid issues, and after that weight continued to go up.  \par \par Past weight loss attempts include:  sleeve, WW, RDN. These have produced a maximum of 75 pounds of weight loss.  \par Anti-obesity medications in the past: none\par \par Reasons for desiring weight loss: health, arthritis\par Perceived obstacles to losing weight: sweets\par \par Sleep: 6-7 hours.  11pm/12 - 6A.  +started some snoring when she's really tired.  \par \par Has 2 regular meals a day.  L/D\par \par Diet history:\par wakes up at: 6A\par B:  starbucks drink, and frape coffee with 2 sweet n lows and [place] vanilla creamer.  usually don't anything in the morning. doesn't feel hungry.  Sometimes oatmeal, or nothing.  Deli - egg whites w tomato, w veg.  \par L: 12 -1am 1/2 [employer] greek salad w chicken - always order stuff. \par snack - kettle corn and diet coke\par D: 730 - order out 4-5 times a week - chicken strips, or red lobster - seafood - shrimp w baked potato.  \par \par snacks: popcorn, ice cream\par eating after dinner: yes\par overeating episodes: sometimes uncomfortable, does feel restriction from surgery\par \par Sodas/fast food/processed foods: a few times a week - chiptole, chik filet\par veg - less than 1\par fruit- less than 1 per day \par sweets 3-5 per day\par \par Water intake per day:  16 oz, really doesn't like water.  \par coffee - 2 cups per day - sweet n low and a creamer.  +decaf. \par diet coke - a few times a week.  \par \par Physical activity:\par Patient enjoys: none\par Current physical activity: walking w daily activities\par No equip at home\par \par Habits patient would like to change: decr sweets, incr activity\par Level of interest in losing weight: 5/5\par Community support: 5/5\par \par Factors that have helped in the past with losing weight and keeping it off: keshawn sleeve. \par \par

## 2023-01-30 NOTE — ASSESSMENT
[FreeTextEntry1] : 57 y.o F with Class 3 obesity, s/p sleeve 2014, hypothyroidism, arthritis, allergies - with pet dander, presents for weight management\par \par - start Wegovy. Side effects reviewed, patient did not have further questions. discussed possible insurance coverage delays w PA. \par - discussed be non-judgemental w herself and avoid perfectionism in this journey\par - start w going back to a deli where she had egg whites and veggies in the morning\par - start having breakfast - very important.  can have at home as well\par - incr water intake - discussed risk of pancreatitis w Wegovy - patient understands\par - movement - consider pool to be easier on knees\par - decr take out - have frozen items or  ready made meals from grocery store - would be better than take out\par - have family discussion on eating healthier, enrique \par - sleeping well overall\par - can see RDN, psych for - sugar addiction and motivation to change behaviors\par \par f/u 4-6 weeks

## 2023-02-06 ENCOUNTER — TRANSCRIPTION ENCOUNTER (OUTPATIENT)
Age: 58
End: 2023-02-06

## 2023-02-13 ENCOUNTER — TRANSCRIPTION ENCOUNTER (OUTPATIENT)
Age: 58
End: 2023-02-13

## 2023-02-21 ENCOUNTER — RX RENEWAL (OUTPATIENT)
Age: 58
End: 2023-02-21

## 2023-03-01 ENCOUNTER — TRANSCRIPTION ENCOUNTER (OUTPATIENT)
Age: 58
End: 2023-03-01

## 2023-03-02 ENCOUNTER — TRANSCRIPTION ENCOUNTER (OUTPATIENT)
Age: 58
End: 2023-03-02

## 2023-04-03 ENCOUNTER — APPOINTMENT (OUTPATIENT)
Dept: BARIATRICS/WEIGHT MGMT | Facility: CLINIC | Age: 58
End: 2023-04-03
Payer: COMMERCIAL

## 2023-04-03 VITALS — BODY MASS INDEX: 39.32 KG/M2 | WEIGHT: 215 LBS

## 2023-04-03 PROCEDURE — 99214 OFFICE O/P EST MOD 30 MIN: CPT | Mod: 95

## 2023-04-03 NOTE — ASSESSMENT
[FreeTextEntry1] : 57 y.o F with Class 3 obesity, s/p sleeve 2014, hypothyroidism, arthritis, allergies - with pet dander, presents for weight management\par \par - incr to Wegovy 0.5mg.  happy w progress\par - incr water intake\par - start w going back to a deli where she had egg whites and veggies in the morning\par - having breakfast more often\par - incr water intake - discussed risk of pancreatitis w Wegovy - patient understands.  2 bottles.  Does get dry mouth w phentermine\par - movement - consider pool to be easier on knees.  in the past went to the gym\par - decr take out - have frozen items or  ready made meals from grocery store - would be better than take out\par - have family discussion on eating healthier, enrique \par - sleeping well overall\par - can see RDN, psych for - sugar addiction and motivation to change behaviors\par \par f/u 4-6 weeks

## 2023-04-03 NOTE — REASON FOR VISIT
[Home] : at home, [unfilled] , at the time of the visit. [Medical Office: ([facility])___] : at the medical office located in  [Follow-Up] : a follow-up visit

## 2023-04-03 NOTE — HISTORY OF PRESENT ILLNESS
[FreeTextEntry1] : Bariatric surgery history: sleeve 2014 Gema\par Obesity co-morbidities: hypothyroidism, arthritis, allergies - with pet dander\par Comorbidities improved or resolved: none\par Anti-obesity medications: Wegovy\par Obesity medication side effects: none\par \par PATIENT WAS NOTIFIED THAT ANYTHING WE DISCUSSED IN OUR MEETING TODAY MAY BE REFLECTED IN WRITING IN THE VISIT NOTE WHICH WILL BE AVAILABLE TO OTHER MEDICAL PROVIDERS TO REVIEW AS PART OF ROUTINE PATIENT CARE.  PATIENT VERBALLY AGREED. \par \par Ms. FELIPA ZELAYA is a 57 year year old female who presents for evaluation and treatment of Class 3 obesity. \par \par Obesity related co- morbidities: hypothyroidism, arthritis  - in knees, back, asthma\par Arthritis - worse with weight gain. 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 \par - for breakfast, eggs or egg whites\par - happy w progress\par - decr portions/appetite\par \par Has gained 20 pounds over the past year.\par Gets cravings all day for sweets.  +stress eating as well.   Take out 4-5 times a week. \par \par Obesity began in teens.  Weight gain has occurred with:  In 20s up to 250, and 30s up to 300, then after keshawn surgery down to 175, now - at that time went to the gym.  Then covid hit - switched to working from home and \par then gradually increased.  With 1st pregnancy had thyroid issues, and after that weight continued to go up.  \par \par Past weight loss attempts include:  sleeve, WW, RDN. These have produced a maximum of 75 pounds of weight loss.  \par Anti-obesity medications in the past: none\par \par Reasons for desiring weight loss: health, arthritis\par Perceived obstacles to losing weight: sweets\par \par Sleep: 6-7 hours.  11pm/12 - 6A.  +started some snoring when she's really tired.  \par \par Has 2 regular meals a day.  L/D\par \par Diet history:\par wakes up at: 6A\par B:  starbucks drink, and frape coffee with 2 sweet n lows and [place]ino vanilla creamer.  usually don't anything in the morning. doesn't feel hungry.  Sometimes oatmeal, or nothing.  Deli - egg whites w tomato, w veg.  \par L: 12 -1am 1/2 [employer] greek salad w chicken - always order stuff. \par snack - kettle corn and diet coke\par D: 730 - order out 4-5 times a week - chicken strips, or red lobster - seafood - shrimp w baked potato.  \par \par snacks: popcorn, ice cream\par eating after dinner: yes\par overeating episodes: sometimes uncomfortable, does feel restriction from surgery\par \par Sodas/fast food/processed foods: a few times a week - chiptole, chik filet\par veg - less than 1\par fruit- less than 1 per day \par sweets 3-5 per day\par \par Water intake per day:  16 oz, really doesn't like water.  \par coffee - 2 cups per day - sweet n low and a creamer.  +decaf. \par diet coke - a few times a week.  \par \par Physical activity:\par Patient enjoys: none\par Current physical activity: walking w daily activities\par No equip at home\par \par Habits patient would like to change: decr sweets, incr activity\par Level of interest in losing weight: 5/5\par Community support: 5/5\par \par Factors that have helped in the past with losing weight and keeping it off: keshawn sleeve. \par \par

## 2023-05-11 ENCOUNTER — APPOINTMENT (OUTPATIENT)
Dept: BARIATRICS/WEIGHT MGMT | Facility: CLINIC | Age: 58
End: 2023-05-11
Payer: COMMERCIAL

## 2023-05-11 VITALS — WEIGHT: 210.8 LBS | BODY MASS INDEX: 38.56 KG/M2

## 2023-05-11 DIAGNOSIS — L30.4 ERYTHEMA INTERTRIGO: ICD-10-CM

## 2023-05-11 PROCEDURE — 99214 OFFICE O/P EST MOD 30 MIN: CPT | Mod: 95

## 2023-05-11 NOTE — ASSESSMENT
[FreeTextEntry1] : 57 y.o F with Class 3 obesity, s/p sleeve 2014, hypothyroidism, arthritis, allergies - with pet dander, presents for weight management\par \par - taking Wegovy 0.5mg.  has 2 more months of it.  Could consider increasing in the future\par - incr water intake\par - start w going back to a deli where she had egg whites and veggies in the morning\par - having breakfast more often\par - incr water intake - discussed risk of pancreatitis w Wegovy - patient understands.  2 bottles.  \par - movement - consider pool to be easier on knees.  in the past went to the gym\par - decr take out - have frozen items or  ready made meals from grocery store - would be better than take out\par - have family discussion on eating healthier, enrique \par - sleeping well overall\par - can see RDN, psych for - sugar addiction and motivation to change behaviors\par \par f/u 6-8 weeks

## 2023-05-11 NOTE — HISTORY OF PRESENT ILLNESS
[FreeTextEntry1] : Bariatric surgery history: sleeve 2014 Gema\par Obesity co-morbidities: hypothyroidism, arthritis, allergies - with pet dander\par Comorbidities improved or resolved: none\par Anti-obesity medications: Wegovy\par Obesity medication side effects: none\par \par PATIENT WAS NOTIFIED THAT ANYTHING WE DISCUSSED IN OUR MEETING TODAY MAY BE REFLECTED IN WRITING IN THE VISIT NOTE WHICH WILL BE AVAILABLE TO OTHER MEDICAL PROVIDERS TO REVIEW AS PART OF ROUTINE PATIENT CARE.  PATIENT VERBALLY AGREED. \par \par Ms. FELIPA ZELAYA is a 57 year year old female who presents for evaluation and treatment of Class 3 obesity. \par \par Obesity related co- morbidities: hypothyroidism, arthritis  - in knees, back, asthma\par Arthritis - worse with weight gain. 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  eating salad once a day for lunch or dinner\par - decr portions/appetite\par \par Has gained 20 pounds over the past year.\par \par Obesity began in teens.  Weight gain has occurred with:  In 20s up to 250, and 30s up to 300, then after keshawn surgery down to 175, now - at that time went to the gym.  Then covid hit - switched to working from home and \par then gradually increased.  With 1st pregnancy had thyroid issues, and after that weight continued to go up.  \par \par Past weight loss attempts include:  sleeve, WW, RDN. These have produced a maximum of 75 pounds of weight loss.  \par Anti-obesity medications in the past: none\par \par Reasons for desiring weight loss: health, arthritis\par Perceived obstacles to losing weight: sweets\par \par Sleep: 6-7 hours.  11pm/12 - 6A.  +started some snoring when she's really tired.  \par \par Has 2 regular meals a day.  L/D\par \par Diet history:\par wakes up at: 6A\par B:  starbucks drink, and frape coffee with 2 sweet n lows and [place] vanilla creamer.  usually don't anything in the morning. doesn't feel hungry.  Sometimes oatmeal, or nothing.  Deli - egg whites w tomato, w veg.  \par L: 12 -1am 1/2 [employer] greek salad w chicken - always order stuff. \par snack - kettle corn and diet coke\par D: 730 - order out 4-5 times a week - chicken strips, or red lobster - seafood - shrimp w baked potato.  \par \par snacks: popcorn, ice cream\par eating after dinner: yes\par overeating episodes: sometimes uncomfortable, does feel restriction from surgery\par \par Sodas/fast food/processed foods: a few times a week - chiptole, chik filet\par veg - less than 1\par fruit- less than 1 per day \par sweets 3-5 per day\par \par Water intake per day:  16 oz, really doesn't like water.  \par coffee - 2 cups per day - sweet n low and a creamer.  +decaf. \par diet coke - a few times a week.  \par \par Physical activity:\par Patient enjoys: none\par Current physical activity: walking w daily activities\par No equip at home. no step tracker. \par

## 2023-05-11 NOTE — REASON FOR VISIT
[Follow-Up] : a follow-up visit [Home] : at home, [unfilled] , at the time of the visit. [Medical Office: ([facility])___] : at the medical office located in

## 2023-06-05 ENCOUNTER — APPOINTMENT (OUTPATIENT)
Dept: ENDOCRINOLOGY | Facility: CLINIC | Age: 58
End: 2023-06-05
Payer: COMMERCIAL

## 2023-06-05 VITALS
HEART RATE: 85 BPM | HEIGHT: 62 IN | OXYGEN SATURATION: 97 % | SYSTOLIC BLOOD PRESSURE: 132 MMHG | BODY MASS INDEX: 38.46 KG/M2 | DIASTOLIC BLOOD PRESSURE: 80 MMHG | WEIGHT: 209 LBS

## 2023-06-05 LAB
ANION GAP SERPL CALC-SCNC: 13 MMOL/L
BUN SERPL-MCNC: 15 MG/DL
CALCIUM SERPL-MCNC: 9.4 MG/DL
CHLORIDE SERPL-SCNC: 106 MMOL/L
CO2 SERPL-SCNC: 22 MMOL/L
CREAT SERPL-MCNC: 0.63 MG/DL
EGFR: 105 ML/MIN/1.73M2
GLUCOSE SERPL-MCNC: 91 MG/DL
POTASSIUM SERPL-SCNC: 4.3 MMOL/L
SODIUM SERPL-SCNC: 141 MMOL/L
T4 FREE SERPL-MCNC: 2 NG/DL
TSH SERPL-ACNC: 0.45 UIU/ML

## 2023-06-05 PROCEDURE — 99214 OFFICE O/P EST MOD 30 MIN: CPT | Mod: 25

## 2023-07-06 ENCOUNTER — APPOINTMENT (OUTPATIENT)
Dept: BARIATRICS/WEIGHT MGMT | Facility: CLINIC | Age: 58
End: 2023-07-06
Payer: COMMERCIAL

## 2023-07-06 VITALS — BODY MASS INDEX: 37.68 KG/M2 | WEIGHT: 206 LBS

## 2023-07-06 PROCEDURE — 99214 OFFICE O/P EST MOD 30 MIN: CPT | Mod: 95

## 2023-07-06 NOTE — HISTORY OF PRESENT ILLNESS
[FreeTextEntry1] : Bariatric surgery history: sleeve 2014 Gema\par Obesity co-morbidities: hypothyroidism, arthritis, allergies - with pet dander\par Comorbidities improved or resolved: none\par Anti-obesity medications: Wegovy\par Obesity medication side effects: none\par \par PATIENT WAS NOTIFIED THAT ANYTHING WE DISCUSSED IN OUR MEETING TODAY MAY BE REFLECTED IN WRITING IN THE VISIT NOTE WHICH WILL BE AVAILABLE TO OTHER MEDICAL PROVIDERS TO REVIEW AS PART OF ROUTINE PATIENT CARE.  PATIENT VERBALLY AGREED. \par \par Ms. FELIPA ZELAYA is a 57 year year old female who presents for evaluation and treatment of Class 3 obesity. \par \par Obesity related co- morbidities: hypothyroidism, arthritis  - in knees, back, asthma\par Arthritis - worse with weight gain. 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 \par - has been walking more, feels difference in knees every day\par - for breakfast, eggs or egg whites\par - happy w progress\par - cravings are better.  eating salad once a day for lunch or dinner\par - decr portions/appetite\par \par Has gained 20 pounds over the past year.\par \par Obesity began in teens.  Weight gain has occurred with:  In 20s up to 250, and 30s up to 300, then after keshawn surgery down to 175, now - at that time went to the gym.  Then covid hit - switched to working from home and \par then gradually increased.  With 1st pregnancy had thyroid issues, and after that weight continued to go up.  \par \par Past weight loss attempts include:  sleeve, WW, RDN. These have produced a maximum of 75 pounds of weight loss.  \par Anti-obesity medications in the past: none\par \par Reasons for desiring weight loss: health, arthritis\par Perceived obstacles to losing weight: sweets\par \par Sleep: 6-7 hours.  11pm/12 - 6A.  +started some snoring when she's really tired.  \par \par Has 2 regular meals a day.  L/D\par \par Diet history:\par wakes up at: 6A\par B:  starbucks drink, and frape coffee with 2 sweet n lows and [place] vanilla creamer.  usually don't anything in the morning. doesn't feel hungry.  Sometimes oatmeal, or nothing.  Deli - egg whites w tomato, w veg.  \par L: 12 -1am 1/2 [employer] greek salad w chicken - always order stuff. \par snack - kettle corn and diet coke\par D: 730 - order out 4-5 times a week - chicken strips, or red lobster - seafood - shrimp w baked potato.  \par \par snacks: popcorn, ice cream\par eating after dinner: yes\par overeating episodes: sometimes uncomfortable, does feel restriction from surgery\par \par Sodas/fast food/processed foods: a few times a week - chiptole, chik filet\par veg - less than 1\par fruit- less than 1 per day \par sweets 3-5 per day\par \par Water intake per day:  16 oz, really doesn't like water.  \par coffee - 2 cups per day - sweet n low and a creamer.  +decaf. \par diet coke - a few times a week.  \par \par Physical activity:\par Patient enjoys: none\par Current physical activity: walking w daily activities\par No equip at home. no step tracker. \par

## 2023-07-06 NOTE — ASSESSMENT
[FreeTextEntry1] : 57 y.o F with Class 3 obesity now class 2 obesity, s/p sleeve 2014, hypothyroidism, arthritis, allergies - with pet dander, presents for weight management\par \par - increase wegovy to 1mg. \par - start w going back to a deli where she had egg whites and veggies in the morning\par - having breakfast more often\par - incr water intake - discussed risk of pancreatitis w Wegovy - patient understands.  2 bottles.  \par - movement - consider pool to be easier on knees.  in the past went to the gym\par - decr take out - have frozen items or  ready made meals from grocery store - would be better than take out\par - have family discussion on eating healthier, enrique \par - sleeping well overall\par - can see RDN, psych for - sugar addiction and motivation to change behaviors\par \par f/u 6-8 weeks

## 2023-07-06 NOTE — REASON FOR VISIT
[Follow-Up] : a follow-up visit [Home] : at home, [unfilled] , at the time of the visit. [Medical Office: ([facility])___] : at the medical office located in

## 2023-07-10 ENCOUNTER — TRANSCRIPTION ENCOUNTER (OUTPATIENT)
Age: 58
End: 2023-07-10

## 2023-07-11 ENCOUNTER — TRANSCRIPTION ENCOUNTER (OUTPATIENT)
Age: 58
End: 2023-07-11

## 2023-07-19 ENCOUNTER — TRANSCRIPTION ENCOUNTER (OUTPATIENT)
Age: 58
End: 2023-07-19

## 2023-07-28 ENCOUNTER — APPOINTMENT (OUTPATIENT)
Dept: PLASTIC SURGERY | Facility: CLINIC | Age: 58
End: 2023-07-28

## 2023-08-20 ENCOUNTER — NON-APPOINTMENT (OUTPATIENT)
Age: 58
End: 2023-08-20

## 2023-09-07 ENCOUNTER — APPOINTMENT (OUTPATIENT)
Dept: BARIATRICS/WEIGHT MGMT | Facility: CLINIC | Age: 58
End: 2023-09-07
Payer: COMMERCIAL

## 2023-09-07 ENCOUNTER — OUTPATIENT (OUTPATIENT)
Dept: OUTPATIENT SERVICES | Facility: HOSPITAL | Age: 58
LOS: 1 days | End: 2023-09-07

## 2023-09-07 VITALS — BODY MASS INDEX: 35.67 KG/M2 | WEIGHT: 195 LBS

## 2023-09-07 DIAGNOSIS — I10 ESSENTIAL (PRIMARY) HYPERTENSION: ICD-10-CM

## 2023-09-07 DIAGNOSIS — Z98.89 OTHER SPECIFIED POSTPROCEDURAL STATES: Chronic | ICD-10-CM

## 2023-09-07 DIAGNOSIS — Z98.51 TUBAL LIGATION STATUS: Chronic | ICD-10-CM

## 2023-09-07 PROCEDURE — 99214 OFFICE O/P EST MOD 30 MIN: CPT | Mod: 95

## 2023-09-07 NOTE — REASON FOR VISIT
[Follow-Up] : a follow-up visit [Home] : at home, [unfilled] , at the time of the visit. [Medical Office: ([facility])___] : at the medical office located in  [Patient] : the patient [Self] : self

## 2023-09-07 NOTE — ASSESSMENT
[FreeTextEntry1] : 57 y.o F with Class 3 obesity now class 2 obesity, s/p sleeve 2014, hypothyroidism, arthritis, allergies - with pet dander, presents for weight management  - continue wegovy 1.7mg.  happy w progress  - start w going back to a deli where she had egg whites and veggies in the morning - having breakfast more often - incr water intake - discussed risk of pancreatitis w Wegovy - patient understands.  2 bottles.   - movement - consider pool to be easier on knees.  in the past went to the gym - decr take out - have frozen items or  ready made meals from grocery store - would be better than take out - have family discussion on eating healthier, enrique  - sleeping well overall - can see RDN, psych for - sugar addiction and motivation to change behaviors  f/u 6-8 weeks

## 2023-10-12 ENCOUNTER — NON-APPOINTMENT (OUTPATIENT)
Age: 58
End: 2023-10-12

## 2023-10-13 ENCOUNTER — APPOINTMENT (OUTPATIENT)
Dept: RADIOLOGY | Facility: CLINIC | Age: 58
End: 2023-10-13
Payer: COMMERCIAL

## 2023-10-13 ENCOUNTER — OUTPATIENT (OUTPATIENT)
Dept: OUTPATIENT SERVICES | Facility: HOSPITAL | Age: 58
LOS: 1 days | End: 2023-10-13
Payer: COMMERCIAL

## 2023-10-13 DIAGNOSIS — Z98.51 TUBAL LIGATION STATUS: Chronic | ICD-10-CM

## 2023-10-13 DIAGNOSIS — Z98.84 BARIATRIC SURGERY STATUS: Chronic | ICD-10-CM

## 2023-10-13 DIAGNOSIS — Z98.89 OTHER SPECIFIED POSTPROCEDURAL STATES: Chronic | ICD-10-CM

## 2023-10-13 DIAGNOSIS — R05.1 ACUTE COUGH: ICD-10-CM

## 2023-10-13 PROCEDURE — 71046 X-RAY EXAM CHEST 2 VIEWS: CPT | Mod: 26

## 2023-10-13 PROCEDURE — 71046 X-RAY EXAM CHEST 2 VIEWS: CPT

## 2023-10-25 ENCOUNTER — TRANSCRIPTION ENCOUNTER (OUTPATIENT)
Age: 58
End: 2023-10-25

## 2023-10-25 ENCOUNTER — RX RENEWAL (OUTPATIENT)
Age: 58
End: 2023-10-25

## 2023-11-09 ENCOUNTER — OUTPATIENT (OUTPATIENT)
Dept: OUTPATIENT SERVICES | Facility: HOSPITAL | Age: 58
LOS: 1 days | End: 2023-11-09
Payer: COMMERCIAL

## 2023-11-09 ENCOUNTER — APPOINTMENT (OUTPATIENT)
Dept: BARIATRICS/WEIGHT MGMT | Facility: CLINIC | Age: 58
End: 2023-11-09
Payer: COMMERCIAL

## 2023-11-09 VITALS — WEIGHT: 189 LBS | BODY MASS INDEX: 34.57 KG/M2

## 2023-11-09 DIAGNOSIS — Z98.51 TUBAL LIGATION STATUS: Chronic | ICD-10-CM

## 2023-11-09 DIAGNOSIS — Z98.84 BARIATRIC SURGERY STATUS: Chronic | ICD-10-CM

## 2023-11-09 DIAGNOSIS — Z98.89 OTHER SPECIFIED POSTPROCEDURAL STATES: Chronic | ICD-10-CM

## 2023-11-09 PROCEDURE — 99214 OFFICE O/P EST MOD 30 MIN: CPT | Mod: 95

## 2023-11-09 PROCEDURE — G0463: CPT

## 2023-11-10 DIAGNOSIS — I10 ESSENTIAL (PRIMARY) HYPERTENSION: ICD-10-CM

## 2023-11-14 ENCOUNTER — APPOINTMENT (OUTPATIENT)
Dept: PULMONOLOGY | Facility: CLINIC | Age: 58
End: 2023-11-14

## 2023-11-15 DIAGNOSIS — E66.01 MORBID (SEVERE) OBESITY DUE TO EXCESS CALORIES: ICD-10-CM

## 2023-11-15 DIAGNOSIS — K21.9 GASTRO-ESOPHAGEAL REFLUX DISEASE WITHOUT ESOPHAGITIS: ICD-10-CM

## 2023-11-15 DIAGNOSIS — Z98.84 BARIATRIC SURGERY STATUS: ICD-10-CM

## 2023-11-17 ENCOUNTER — APPOINTMENT (OUTPATIENT)
Dept: MAMMOGRAPHY | Facility: CLINIC | Age: 58
End: 2023-11-17
Payer: COMMERCIAL

## 2023-11-17 ENCOUNTER — OUTPATIENT (OUTPATIENT)
Dept: OUTPATIENT SERVICES | Facility: HOSPITAL | Age: 58
LOS: 1 days | End: 2023-11-17
Payer: COMMERCIAL

## 2023-11-17 ENCOUNTER — APPOINTMENT (OUTPATIENT)
Dept: RADIOLOGY | Facility: CLINIC | Age: 58
End: 2023-11-17
Payer: COMMERCIAL

## 2023-11-17 ENCOUNTER — APPOINTMENT (OUTPATIENT)
Dept: ULTRASOUND IMAGING | Facility: CLINIC | Age: 58
End: 2023-11-17
Payer: COMMERCIAL

## 2023-11-17 DIAGNOSIS — R92.2 INCONCLUSIVE MAMMOGRAM: ICD-10-CM

## 2023-11-17 DIAGNOSIS — Z98.84 BARIATRIC SURGERY STATUS: Chronic | ICD-10-CM

## 2023-11-17 DIAGNOSIS — Z98.89 OTHER SPECIFIED POSTPROCEDURAL STATES: Chronic | ICD-10-CM

## 2023-11-17 DIAGNOSIS — Z98.51 TUBAL LIGATION STATUS: Chronic | ICD-10-CM

## 2023-11-17 DIAGNOSIS — E03.9 HYPOTHYROIDISM, UNSPECIFIED: ICD-10-CM

## 2023-11-17 PROCEDURE — 77085 DXA BONE DENSITY AXL VRT FX: CPT

## 2023-11-17 PROCEDURE — 77067 SCR MAMMO BI INCL CAD: CPT | Mod: 26

## 2023-11-17 PROCEDURE — 77063 BREAST TOMOSYNTHESIS BI: CPT

## 2023-11-17 PROCEDURE — 76641 ULTRASOUND BREAST COMPLETE: CPT | Mod: 26,50

## 2023-11-17 PROCEDURE — 77063 BREAST TOMOSYNTHESIS BI: CPT | Mod: 26

## 2023-11-17 PROCEDURE — 76641 ULTRASOUND BREAST COMPLETE: CPT

## 2023-11-17 PROCEDURE — 77085 DXA BONE DENSITY AXL VRT FX: CPT | Mod: 26

## 2023-11-17 PROCEDURE — 77067 SCR MAMMO BI INCL CAD: CPT

## 2023-12-07 ENCOUNTER — RX RENEWAL (OUTPATIENT)
Age: 58
End: 2023-12-07

## 2023-12-07 RX ORDER — ALENDRONATE SODIUM AND CHOLECALCIFEROL 70; 5600 MG/1; [IU]/1
70-5600 TABLET ORAL
Qty: 12 | Refills: 2 | Status: ACTIVE | COMMUNITY
Start: 2021-12-03 | End: 1900-01-01

## 2024-01-17 ENCOUNTER — APPOINTMENT (OUTPATIENT)
Dept: BARIATRICS/WEIGHT MGMT | Facility: CLINIC | Age: 59
End: 2024-01-17
Payer: COMMERCIAL

## 2024-01-17 VITALS — WEIGHT: 177 LBS | BODY MASS INDEX: 32.37 KG/M2

## 2024-01-17 DIAGNOSIS — M81.0 AGE-RELATED OSTEOPOROSIS W/OUT CURRENT PATHOLOGICAL FRACTURE: ICD-10-CM

## 2024-01-17 DIAGNOSIS — K21.9 GASTRO-ESOPHAGEAL REFLUX DISEASE W/OUT ESOPHAGITIS: ICD-10-CM

## 2024-01-17 DIAGNOSIS — M19.90 UNSPECIFIED OSTEOARTHRITIS, UNSPECIFIED SITE: ICD-10-CM

## 2024-01-17 PROCEDURE — 99214 OFFICE O/P EST MOD 30 MIN: CPT | Mod: 95

## 2024-01-17 NOTE — REASON FOR VISIT
[Follow-Up] : a follow-up visit [Home] : at home, [unfilled] , at the time of the visit. [Medical Office: ([facility])___] : at the medical office located in  [Patient] : the patient [Self] : self

## 2024-01-17 NOTE — ASSESSMENT
[FreeTextEntry1] : 58 y.o F with Class 3 obesity now class 1 obesity, s/p sleeve 2014, hypothyroidism, arthritis, allergies - with pet dander, presents for weight management  - continue wegovy  2.4 mg. happy w progress - having breakfast more often - incr water intake - discussed risk of pancreatitis w Wegovy - patient understands. 2 bottles. - movement - able to walk without pain.  walking more - goal >5k per day, and incr intensity.  she says she's tired and unmotivated but understands that its an important part of her journey to work on - decr take out - have frozen items or  ready made meals from grocery store - would be better than take out - have family discussion on eating healthier, enrique  - sleeping well overall - can see RDN, psych for - sugar addiction and motivation to change behaviors  f/u 6-8 weeks. - discussed transition to new provider

## 2024-01-17 NOTE — HISTORY OF PRESENT ILLNESS
[FreeTextEntry1] : Bariatric surgery history: sleeve 2014 Gema Obesity co-morbidities: hypothyroidism, arthritis, allergies - with pet dander Comorbidities improved or resolved: none Anti-obesity medications: Wegovy Obesity medication side effects: none  PATIENT WAS NOTIFIED THAT ANYTHING WE DISCUSSED IN OUR MEETING TODAY MAY BE REFLECTED IN WRITING IN THE VISIT NOTE WHICH WILL BE AVAILABLE TO OTHER MEDICAL PROVIDERS TO REVIEW AS PART OF ROUTINE PATIENT CARE.  PATIENT VERBALLY AGREED.   Ms. FELIPA ZELAYA is a 58 year year old female who presents for evaluation and treatment of Class 3 obesity, now Class 1 obesity.  Obesity related co- morbidities: hypothyroidism, arthritis  - in knees, back, asthma Arthritis - worse with weight gain. Decr cartilage -  now no knee pain.  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  We went over EBWL 76% which is above average for sleeve patient - no knee pain anymore w walking.  so walking more - has been walking more, 3-4 steps, discussed increasing further bc she would like to taper off glp-1 meds eventually - for breakfast, eggs or egg whites - cravings are better.  eating salad once a day for lunch or dinner - decr portions/appetite  Has gained 20 pounds over the past year.  Obesity began in teens.  Weight gain has occurred with:  In 20s up to 250, and 30s up to 300, then after keshawn surgery down to 175, now - at that time went to the gym.  Then covid hit - switched to working from home and  then gradually increased.  With 1st pregnancy had thyroid issues, and after that weight continued to go up.    Past weight loss attempts include:  sleeve, WW, RDN. These have produced a maximum of 75 pounds of weight loss.   Anti-obesity medications in the past: none  Reasons for desiring weight loss: health, arthritis Perceived obstacles to losing weight: sweets  Sleep: 6-7 hours.  11pm/12 - 6A.  +started some snoring when she's really tired.    Has 2 regular meals a day.  L/D  Diet history: wakes up at: 6A B:  starbucks drink, and frape coffee with 2 sweet n lows and [place] vanilla creamer.  usually don't anything in the morning. doesn't feel hungry.  Sometimes oatmeal, or nothing.  Deli - egg whites w tomato, w veg.   L: 12 -1am 1/2 [employer] greek salad w chicken - always order stuff.  snack - kettle corn and diet coke D: 730 - order out 4-5 times a week - chicken strips, or red lobster - seafood - shrimp w baked potato.    snacks: popcorn, ice cream eating after dinner: yes overeating episodes: sometimes uncomfortable, does feel restriction from surgery  Sodas/fast food/processed foods: a few times a week - chiptole, chik filet veg - less than 1 fruit- less than 1 per day  sweets 3-5 per day  Water intake per day:  16 oz, really doesn't like water.   coffee - 2 cups per day - sweet n low and a creamer.  +decaf.  diet coke - a few times a week.    Physical activity: Patient enjoys: none Current physical activity: walking w daily activities No equip at home. no step tracker.

## 2024-01-22 ENCOUNTER — TRANSCRIPTION ENCOUNTER (OUTPATIENT)
Age: 59
End: 2024-01-22

## 2024-02-12 RX ORDER — LEVOTHYROXINE SODIUM 0.15 MG/1
150 TABLET ORAL
Qty: 90 | Refills: 1 | Status: ACTIVE | COMMUNITY
Start: 2021-12-03 | End: 1900-01-01

## 2024-04-03 ENCOUNTER — OUTPATIENT (OUTPATIENT)
Dept: OUTPATIENT SERVICES | Facility: HOSPITAL | Age: 59
LOS: 1 days | End: 2024-04-03
Payer: COMMERCIAL

## 2024-04-03 ENCOUNTER — APPOINTMENT (OUTPATIENT)
Dept: BARIATRICS/WEIGHT MGMT | Facility: CLINIC | Age: 59
End: 2024-04-03
Payer: COMMERCIAL

## 2024-04-03 VITALS — WEIGHT: 172 LBS | BODY MASS INDEX: 31.65 KG/M2 | HEIGHT: 62 IN

## 2024-04-03 DIAGNOSIS — E03.9 HYPOTHYROIDISM, UNSPECIFIED: ICD-10-CM

## 2024-04-03 DIAGNOSIS — Z98.51 TUBAL LIGATION STATUS: Chronic | ICD-10-CM

## 2024-04-03 DIAGNOSIS — Z98.84 BARIATRIC SURGERY STATUS: ICD-10-CM

## 2024-04-03 DIAGNOSIS — Z98.89 OTHER SPECIFIED POSTPROCEDURAL STATES: Chronic | ICD-10-CM

## 2024-04-03 DIAGNOSIS — Z98.84 BARIATRIC SURGERY STATUS: Chronic | ICD-10-CM

## 2024-04-03 DIAGNOSIS — E66.01 MORBID (SEVERE) OBESITY DUE TO EXCESS CALORIES: ICD-10-CM

## 2024-04-03 PROCEDURE — 99213 OFFICE O/P EST LOW 20 MIN: CPT | Mod: 95

## 2024-04-03 PROCEDURE — G0463: CPT

## 2024-04-03 NOTE — HISTORY OF PRESENT ILLNESS
[FreeTextEntry1] : Bariatric surgery history: sleeve 2014 Gema Obesity co-morbidities: hypothyroidism, arthritis, allergies - with pet dander Comorbidities improved or resolved: none Anti-obesity medications: Wegovy Obesity medication side effects: none   Ms. FELIPA ZELAYA is a 58 year year old female who presents for evaluation and treatment of Class 3 obesity, now Class 1 obesity.  Obesity related co- morbidities: hypothyroidism, arthritis  - in knees, back, asthma Arthritis - worse with weight gain. Decr cartilage -  now no knee pain.  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  - taking Wegovy 2.4 mg, tolerating it well/no side effects except minor dry mouth.  - eats healthy, salads, egg whites. Portion sizes smaller.  - post op surgery was 175 or so,then regained 45# during covid, now lost that so feeling positive and motivated.   - no knee pain anymore w walking.  so walking more - has been walking more, 3-4 steps, discussed increasing further bc she would like to taper off glp-1 meds eventually - cravings are better.  eating salad once a day for lunch or dinner -discussed her long-term goal is to be around 160lbs but is very happy where she is at now.   Pt remains highly motivated and doing very well.

## 2024-04-03 NOTE — REASON FOR VISIT
[Follow-Up] : a follow-up visit [Home] : at home, [unfilled] , at the time of the visit. [Self] : self [Patient] : the patient [Other Location: e.g. Home (Enter Location, City,State)___] : at [unfilled]

## 2024-04-03 NOTE — ASSESSMENT
[FreeTextEntry1] : 58 y.o F with Class 3 obesity now class 1 obesity, s/p sleeve 2014, hypothyroidism, arthritis, allergies - with pet dander, presents for weight management  - continue wegovy  2.4 mg. happy w progress - continue healthy eating pattern/portion control.  - movement - able to walk without pain.  walking more - goal >5k per day, and incr intensity.  Discussed need for continued consistent physical activity to maintain wt loss with time.  - have family discussion on eating healthier, enrique  - sleeping well overall   f/u 3 months in maintenance phase.

## 2024-04-04 DIAGNOSIS — I10 ESSENTIAL (PRIMARY) HYPERTENSION: ICD-10-CM

## 2024-04-10 DIAGNOSIS — Z98.84 BARIATRIC SURGERY STATUS: ICD-10-CM

## 2024-04-10 DIAGNOSIS — E66.01 MORBID (SEVERE) OBESITY DUE TO EXCESS CALORIES: ICD-10-CM

## 2024-04-10 DIAGNOSIS — E03.9 HYPOTHYROIDISM, UNSPECIFIED: ICD-10-CM

## 2024-06-07 ENCOUNTER — TRANSCRIPTION ENCOUNTER (OUTPATIENT)
Age: 59
End: 2024-06-07

## 2024-06-11 RX ORDER — SEMAGLUTIDE 2.4 MG/.75ML
2.4 INJECTION, SOLUTION SUBCUTANEOUS
Qty: 9 | Refills: 0 | Status: ACTIVE | COMMUNITY
Start: 2023-01-30 | End: 1900-01-01

## 2024-07-03 ENCOUNTER — APPOINTMENT (OUTPATIENT)
Dept: BARIATRICS/WEIGHT MGMT | Facility: CLINIC | Age: 59
End: 2024-07-03

## 2024-07-03 ENCOUNTER — APPOINTMENT (OUTPATIENT)
Dept: ENDOCRINOLOGY | Facility: CLINIC | Age: 59
End: 2024-07-03

## 2024-07-09 ENCOUNTER — APPOINTMENT (OUTPATIENT)
Dept: BARIATRICS/WEIGHT MGMT | Facility: CLINIC | Age: 59
End: 2024-07-09

## 2024-07-09 ENCOUNTER — OUTPATIENT (OUTPATIENT)
Dept: OUTPATIENT SERVICES | Facility: HOSPITAL | Age: 59
LOS: 1 days | End: 2024-07-09

## 2024-07-09 VITALS — BODY MASS INDEX: 30.73 KG/M2 | WEIGHT: 167 LBS | HEIGHT: 62 IN

## 2024-07-09 DIAGNOSIS — Z98.84 BARIATRIC SURGERY STATUS: Chronic | ICD-10-CM

## 2024-07-09 DIAGNOSIS — E66.01 MORBID (SEVERE) OBESITY DUE TO EXCESS CALORIES: ICD-10-CM

## 2024-07-09 DIAGNOSIS — E03.9 HYPOTHYROIDISM, UNSPECIFIED: ICD-10-CM

## 2024-07-09 DIAGNOSIS — Z98.51 TUBAL LIGATION STATUS: Chronic | ICD-10-CM

## 2024-07-09 DIAGNOSIS — Z98.89 OTHER SPECIFIED POSTPROCEDURAL STATES: Chronic | ICD-10-CM

## 2024-07-09 PROCEDURE — 99213 OFFICE O/P EST LOW 20 MIN: CPT | Mod: 95

## 2024-07-17 ENCOUNTER — TRANSCRIPTION ENCOUNTER (OUTPATIENT)
Age: 59
End: 2024-07-17

## 2024-09-11 ENCOUNTER — TRANSCRIPTION ENCOUNTER (OUTPATIENT)
Age: 59
End: 2024-09-11

## 2024-09-16 ENCOUNTER — LABORATORY RESULT (OUTPATIENT)
Age: 59
End: 2024-09-16

## 2024-09-18 ENCOUNTER — APPOINTMENT (OUTPATIENT)
Dept: ENDOCRINOLOGY | Facility: CLINIC | Age: 59
End: 2024-09-18
Payer: COMMERCIAL

## 2024-09-18 VITALS
SYSTOLIC BLOOD PRESSURE: 117 MMHG | HEIGHT: 62 IN | DIASTOLIC BLOOD PRESSURE: 77 MMHG | WEIGHT: 161 LBS | OXYGEN SATURATION: 100 % | HEART RATE: 77 BPM | BODY MASS INDEX: 29.63 KG/M2

## 2024-09-18 VITALS — HEIGHT: 62 IN | BODY MASS INDEX: 29.63 KG/M2 | WEIGHT: 161 LBS

## 2024-09-18 DIAGNOSIS — E03.9 HYPOTHYROIDISM, UNSPECIFIED: ICD-10-CM

## 2024-09-18 DIAGNOSIS — M81.0 AGE-RELATED OSTEOPOROSIS W/OUT CURRENT PATHOLOGICAL FRACTURE: ICD-10-CM

## 2024-09-18 LAB
25(OH)D3 SERPL-MCNC: 35.1 NG/ML
ANION GAP SERPL CALC-SCNC: 15 MMOL/L
BUN SERPL-MCNC: 18 MG/DL
CALCIUM SERPL-MCNC: 9.6 MG/DL
CHLORIDE SERPL-SCNC: 103 MMOL/L
CO2 SERPL-SCNC: 22 MMOL/L
CREAT SERPL-MCNC: 0.73 MG/DL
EGFR: 96 ML/MIN/1.73M2
GLUCOSE SERPL-MCNC: 83 MG/DL
POTASSIUM SERPL-SCNC: 4.5 MMOL/L
SODIUM SERPL-SCNC: 140 MMOL/L
TSH SERPL-ACNC: 0.12 UIU/ML

## 2024-09-18 PROCEDURE — 99214 OFFICE O/P EST MOD 30 MIN: CPT

## 2024-09-18 NOTE — PHYSICAL EXAM
[Alert] : alert [Well Nourished] : well nourished [Obese] : obese [No Acute Distress] : no acute distress [EOMI] : extra ocular movement intact [No Proptosis] : no proptosis [No Lid Lag] : no lid lag [Normal Hearing] : hearing was normal [Thyroid Not Enlarged] : the thyroid was not enlarged [No Respiratory Distress] : no respiratory distress [No Accessory Muscle Use] : no accessory muscle use [Normal Rate] : heart rate was normal [No Stigmata of Cushings Syndrome] : no stigmata of Cushings Syndrome [Normal Gait] : normal gait [No Rash] : no rash [No Motor Deficits] : the motor exam was normal [No Tremors] : no tremors [Oriented x3] : oriented to person, place, and time [Normal Affect] : the affect was normal [Normal Insight/Judgement] : insight and judgment were intact [Normal Mood] : the mood was normal [de-identified] : BMI 39

## 2024-09-18 NOTE — HISTORY OF PRESENT ILLNESS
[FreeTextEntry1] : Ms. Molina is presenting for follow up care for her hypothyroidism and osteoporosis.    59 year old F with PMH of s/p lap sleeve gastrectomy in 2014 arthritis, hypothyroidism and osteoporosis.  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 Pt was previously 275mcg pre bariatric surgery, now on 200mcg. No MI or stroke. Pre op weight 306, lowest weight was 177lbs in feb 2016 and then pt starting eating poorly. Pt is weight stable at 205lbs. She also stopped exercising. ROS negative diarrhea, increased bowel movements, blurry vision, heat intolerance, palpitations, anxiety.  TSH level in feb 2021 was low, 0.04 with a FT4 of 1.9. Repeat August improved to TSH 0.24, FT4 1.8.  Today TSH 5.7 (increased from 0.15) with dose decreased from 200mcg to 150mcg QD.  No h/o thyroid sonogram.  Dec 2022 TSH 0.41 FT4 1.7 on LT4 150mcg 8x/week  May 2023 TSH 0.45, FT4 2.0 on LT4 150mcg 7.5/week  Sept 2024 TSH 0.12, FT4 2.0 on LT4 150mcg once daily   #Osteoporosis  Fracture history: None Family history: No parental history of hip fracture Prior Treatment: None  Prior HRT: None  DEXA scan Nov 2023: Femoral neck: -2.0, Total hip: -1.4, Spine: -0.7 DEXA scan Nov 2021 on routine bone density significant for T-scores of -1.0 at the lumbar spine, -2.5 at the femoral neck Bone scan 2018: Femoral neck: -1.4, Total hip: -0.7, Spine: 0.0.  Patient currently on fosamax weekly Started Dec 2021. Tolerating well.  Vit D wnl, calcium wnl

## 2024-09-18 NOTE — PHYSICAL EXAM
[Alert] : alert [Well Nourished] : well nourished [Obese] : obese [No Acute Distress] : no acute distress [EOMI] : extra ocular movement intact [No Proptosis] : no proptosis [No Lid Lag] : no lid lag [Normal Hearing] : hearing was normal [Thyroid Not Enlarged] : the thyroid was not enlarged [No Respiratory Distress] : no respiratory distress [No Accessory Muscle Use] : no accessory muscle use [Normal Rate] : heart rate was normal [No Stigmata of Cushings Syndrome] : no stigmata of Cushings Syndrome [Normal Gait] : normal gait [No Rash] : no rash [No Motor Deficits] : the motor exam was normal [No Tremors] : no tremors [Oriented x3] : oriented to person, place, and time [Normal Affect] : the affect was normal [Normal Insight/Judgement] : insight and judgment were intact [Normal Mood] : the mood was normal [de-identified] : BMI 39

## 2024-09-18 NOTE — HISTORY OF PRESENT ILLNESS
[FreeTextEntry1] : Ms. Molina is presenting for follow up care for her hypothyroidism and osteoporosis.    59 year old F with PMH of s/p lap sleeve gastrectomy in 2014 arthritis, hypothyroidism and osteoporosis.  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 Pt was previously 275mcg pre bariatric surgery, now on 200mcg. No MI or stroke. Pre op weight 306, lowest weight was 177lbs in feb 2016 and then pt starting eating poorly. Pt is weight stable at 205lbs. She also stopped exercising. ROS negative diarrhea, increased bowel movements, blurry vision, heat intolerance, palpitations, anxiety.  TSH level in feb 2021 was low, 0.04 with a FT4 of 1.9. Repeat August improved to TSH 0.24, FT4 1.8.  Today TSH 5.7 (increased from 0.15) with dose decreased from 200mcg to 150mcg QD.  No h/o thyroid sonogram.  Dec 2022 TSH 0.41 FT4 1.7 on LT4 150mcg 8x/week  May 2023 TSH 0.45, FT4 2.0 on LT4 150mcg 7.5/week  Sept 2024 TSH 0.12, FT4 2.0 on LT4 150mcg once daily   #Osteoporosis  Fracture history: None Family history: No parental history of hip fracture Prior Treatment: None  Prior HRT: None  DEXA scan Nov 2023: Femoral neck: -2.0, Total hip: -1.4, Spine: -0.7 DEXA scan Nov 2021 on routine bone density significant for T-scores of -1.0 at the lumbar spine, -2.5 at the femoral neck Bone scan 2018: Femoral neck: -1.4, Total hip: -0.7, Spine: 0.0.  Patient currently on fosamax weekly Started Dec 2021. Tolerating well.  Vit D wnl, calcium wnl

## 2024-09-18 NOTE — ASSESSMENT
[Weight Loss] : weight loss [Bisphosphonates] : The patient was instructed to take bisphosphonates on an empty stomach with a full glass of water,and wait at least 30 minutes before eating or lying down [Levothyroxine] : The patient was instructed to take Levothyroxine on an empty stomach, separate from vitamins, and wait at least 30 minutes before eating [FreeTextEntry1] : 59 year old F s/p lap sleeve gastrectomy in 2014, now on wegovy presenting for follow up of her hypothyroidism and Osteoporosis.   1. Hypothyroidism  TSH now 0.12, FT4 2.0 Sept 2024 Decrease LT4 150mcg from once daily to 6x/week. Repeat labs in 6-8 weeks   2. Osteoporosis.  She has no history of fragility fracture. Her bone density is -2.5 at hip and she has 2 risk factors for fracture. Continue weekly Fosamax (started Dec 2021) DEXA scan due Nov 2025  Patient verbalized understanding of the above. All questions were answered to patient's satisfaction. Dispo: Patient will follow up in 6 months.

## 2024-09-19 LAB
COLLAGEN NTX UR-SCNC: 548 NMOL BCE
COLLAGEN NTX/CREAT UR-SRTO: 35
CREAT UR-MCNC: 179.2 MG/DL
INTERPRETIVE GUIDE:: NORMAL

## 2024-10-07 ENCOUNTER — OUTPATIENT (OUTPATIENT)
Dept: OUTPATIENT SERVICES | Facility: HOSPITAL | Age: 59
LOS: 1 days | End: 2024-10-07
Payer: COMMERCIAL

## 2024-10-07 ENCOUNTER — APPOINTMENT (OUTPATIENT)
Dept: BARIATRICS/WEIGHT MGMT | Facility: CLINIC | Age: 59
End: 2024-10-07
Payer: COMMERCIAL

## 2024-10-07 VITALS — WEIGHT: 159 LBS | BODY MASS INDEX: 29.26 KG/M2 | HEIGHT: 62 IN

## 2024-10-07 DIAGNOSIS — E66.813 OBESITY, CLASS 3: ICD-10-CM

## 2024-10-07 DIAGNOSIS — I10 ESSENTIAL (PRIMARY) HYPERTENSION: ICD-10-CM

## 2024-10-07 DIAGNOSIS — Z98.84 BARIATRIC SURGERY STATUS: Chronic | ICD-10-CM

## 2024-10-07 DIAGNOSIS — E03.9 HYPOTHYROIDISM, UNSPECIFIED: ICD-10-CM

## 2024-10-07 DIAGNOSIS — Z98.51 TUBAL LIGATION STATUS: Chronic | ICD-10-CM

## 2024-10-07 DIAGNOSIS — Z90.49 ACQUIRED ABSENCE OF OTHER SPECIFIED PARTS OF DIGESTIVE TRACT: ICD-10-CM

## 2024-10-07 DIAGNOSIS — Z98.89 OTHER SPECIFIED POSTPROCEDURAL STATES: Chronic | ICD-10-CM

## 2024-10-07 PROCEDURE — 99214 OFFICE O/P EST MOD 30 MIN: CPT | Mod: 95

## 2024-10-07 PROCEDURE — G0463: CPT

## 2024-10-07 PROCEDURE — G2211 COMPLEX E/M VISIT ADD ON: CPT | Mod: 95

## 2024-10-09 ENCOUNTER — TRANSCRIPTION ENCOUNTER (OUTPATIENT)
Age: 59
End: 2024-10-09

## 2024-10-10 ENCOUNTER — TRANSCRIPTION ENCOUNTER (OUTPATIENT)
Age: 59
End: 2024-10-10

## 2024-10-15 ENCOUNTER — TRANSCRIPTION ENCOUNTER (OUTPATIENT)
Age: 59
End: 2024-10-15

## 2024-10-15 DIAGNOSIS — E66.813 OBESITY, CLASS 3: ICD-10-CM

## 2024-10-15 DIAGNOSIS — E03.9 HYPOTHYROIDISM, UNSPECIFIED: ICD-10-CM

## 2024-12-03 ENCOUNTER — TRANSCRIPTION ENCOUNTER (OUTPATIENT)
Age: 59
End: 2024-12-03

## 2025-01-02 ENCOUNTER — TRANSCRIPTION ENCOUNTER (OUTPATIENT)
Age: 60
End: 2025-01-02

## 2025-01-07 ENCOUNTER — OUTPATIENT (OUTPATIENT)
Dept: OUTPATIENT SERVICES | Facility: HOSPITAL | Age: 60
LOS: 1 days | End: 2025-01-07
Payer: COMMERCIAL

## 2025-01-07 ENCOUNTER — OUTPATIENT (OUTPATIENT)
Dept: OUTPATIENT SERVICES | Facility: HOSPITAL | Age: 60
LOS: 1 days | End: 2025-01-07

## 2025-01-07 ENCOUNTER — APPOINTMENT (OUTPATIENT)
Dept: BARIATRICS/WEIGHT MGMT | Facility: CLINIC | Age: 60
End: 2025-01-07
Payer: COMMERCIAL

## 2025-01-07 VITALS — BODY MASS INDEX: 28.71 KG/M2 | HEIGHT: 62 IN | WEIGHT: 156 LBS

## 2025-01-07 DIAGNOSIS — I10 ESSENTIAL (PRIMARY) HYPERTENSION: ICD-10-CM

## 2025-01-07 DIAGNOSIS — Z98.51 TUBAL LIGATION STATUS: Chronic | ICD-10-CM

## 2025-01-07 DIAGNOSIS — Z98.84 BARIATRIC SURGERY STATUS: Chronic | ICD-10-CM

## 2025-01-07 DIAGNOSIS — Z98.89 OTHER SPECIFIED POSTPROCEDURAL STATES: Chronic | ICD-10-CM

## 2025-01-07 DIAGNOSIS — E03.9 HYPOTHYROIDISM, UNSPECIFIED: ICD-10-CM

## 2025-01-07 DIAGNOSIS — Z98.84 BARIATRIC SURGERY STATUS: ICD-10-CM

## 2025-01-07 DIAGNOSIS — E66.813 OBESITY, CLASS 3: ICD-10-CM

## 2025-01-07 PROCEDURE — G0463: CPT

## 2025-01-07 PROCEDURE — 99214 OFFICE O/P EST MOD 30 MIN: CPT | Mod: 95

## 2025-01-11 ENCOUNTER — TRANSCRIPTION ENCOUNTER (OUTPATIENT)
Age: 60
End: 2025-01-11

## 2025-01-13 ENCOUNTER — TRANSCRIPTION ENCOUNTER (OUTPATIENT)
Age: 60
End: 2025-01-13

## 2025-01-13 LAB
ALBUMIN SERPL ELPH-MCNC: 4.2 G/DL
ALP BLD-CCNC: 63 U/L
ALT SERPL-CCNC: 10 U/L
ANION GAP SERPL CALC-SCNC: 10 MMOL/L
AST SERPL-CCNC: 13 U/L
BASOPHILS # BLD AUTO: 0.05 K/UL
BASOPHILS NFR BLD AUTO: 0.8 %
BILIRUB SERPL-MCNC: 0.5 MG/DL
BUN SERPL-MCNC: 21 MG/DL
CALCIUM SERPL-MCNC: 9.4 MG/DL
CHLORIDE SERPL-SCNC: 107 MMOL/L
CHOLEST SERPL-MCNC: 151 MG/DL
CHOLEST/HDLC SERPL: 2.7 RATIO
CO2 SERPL-SCNC: 25 MMOL/L
CREAT SERPL-MCNC: 0.65 MG/DL
EGFR: 103 ML/MIN/1.73M2
EOSINOPHIL # BLD AUTO: 0.13 K/UL
EOSINOPHIL NFR BLD AUTO: 2 %
FERRITIN SERPL-MCNC: 107 NG/ML
FOLATE SERPL-MCNC: 4.4 NG/ML
GLUCOSE SERPL-MCNC: 82 MG/DL
HCT VFR BLD CALC: 38.3 %
HDLC SERPL-MCNC: 56 MG/DL
HGB BLD-MCNC: 12.4 G/DL
IMM GRANULOCYTES NFR BLD AUTO: 0.2 %
IRON SATN MFR SERPL: 26 %
IRON SERPL-MCNC: 83 UG/DL
LDLC SERPL CALC-MCNC: 81 MG/DL
LYMPHOCYTES # BLD AUTO: 2.12 K/UL
LYMPHOCYTES NFR BLD AUTO: 33 %
MAGNESIUM SERPL-MCNC: 2 MG/DL
MAN DIFF?: NORMAL
MCHC RBC-ENTMCNC: 29.2 PG
MCHC RBC-ENTMCNC: 32.4 G/DL
MCV RBC AUTO: 90.1 FL
MONOCYTES # BLD AUTO: 0.73 K/UL
MONOCYTES NFR BLD AUTO: 11.4 %
NEUTROPHILS # BLD AUTO: 3.38 K/UL
NEUTROPHILS NFR BLD AUTO: 52.6 %
NONHDLC SERPL-MCNC: 96 MG/DL
PHOSPHATE SERPL-MCNC: 3.7 MG/DL
PLATELET # BLD AUTO: 351 K/UL
POTASSIUM SERPL-SCNC: 4.4 MMOL/L
PROT SERPL-MCNC: 7.4 G/DL
RBC # BLD: 4.25 M/UL
RBC # FLD: 13.2 %
SODIUM SERPL-SCNC: 142 MMOL/L
T4 FREE SERPL-MCNC: 1.6 NG/DL
TIBC SERPL-MCNC: 315 UG/DL
TRIGL SERPL-MCNC: 78 MG/DL
TSH SERPL-ACNC: 0.59 UIU/ML
UIBC SERPL-MCNC: 232 UG/DL
VIT B12 SERPL-MCNC: 425 PG/ML
WBC # FLD AUTO: 6.42 K/UL

## 2025-01-15 DIAGNOSIS — E03.9 HYPOTHYROIDISM, UNSPECIFIED: ICD-10-CM

## 2025-01-15 DIAGNOSIS — Z98.84 BARIATRIC SURGERY STATUS: ICD-10-CM

## 2025-01-15 DIAGNOSIS — E66.813 OBESITY, CLASS 3: ICD-10-CM

## 2025-01-16 ENCOUNTER — APPOINTMENT (OUTPATIENT)
Dept: MAMMOGRAPHY | Facility: CLINIC | Age: 60
End: 2025-01-16
Payer: COMMERCIAL

## 2025-01-16 ENCOUNTER — APPOINTMENT (OUTPATIENT)
Dept: ULTRASOUND IMAGING | Facility: CLINIC | Age: 60
End: 2025-01-16
Payer: COMMERCIAL

## 2025-01-16 ENCOUNTER — TRANSCRIPTION ENCOUNTER (OUTPATIENT)
Age: 60
End: 2025-01-16

## 2025-01-16 ENCOUNTER — OUTPATIENT (OUTPATIENT)
Dept: OUTPATIENT SERVICES | Facility: HOSPITAL | Age: 60
LOS: 1 days | End: 2025-01-16
Payer: COMMERCIAL

## 2025-01-16 DIAGNOSIS — Z98.84 BARIATRIC SURGERY STATUS: Chronic | ICD-10-CM

## 2025-01-16 DIAGNOSIS — Z98.89 OTHER SPECIFIED POSTPROCEDURAL STATES: Chronic | ICD-10-CM

## 2025-01-16 DIAGNOSIS — Z98.51 TUBAL LIGATION STATUS: Chronic | ICD-10-CM

## 2025-01-16 DIAGNOSIS — Z12.31 ENCOUNTER FOR SCREENING MAMMOGRAM FOR MALIGNANT NEOPLASM OF BREAST: ICD-10-CM

## 2025-01-16 PROCEDURE — 77063 BREAST TOMOSYNTHESIS BI: CPT

## 2025-01-16 PROCEDURE — 77067 SCR MAMMO BI INCL CAD: CPT

## 2025-01-16 PROCEDURE — 77063 BREAST TOMOSYNTHESIS BI: CPT | Mod: 26

## 2025-01-16 PROCEDURE — 77067 SCR MAMMO BI INCL CAD: CPT | Mod: 26

## 2025-02-25 ENCOUNTER — TRANSCRIPTION ENCOUNTER (OUTPATIENT)
Age: 60
End: 2025-02-25

## 2025-03-04 ENCOUNTER — NON-APPOINTMENT (OUTPATIENT)
Age: 60
End: 2025-03-04

## 2025-03-04 ENCOUNTER — APPOINTMENT (OUTPATIENT)
Dept: INTERNAL MEDICINE | Facility: CLINIC | Age: 60
End: 2025-03-04
Payer: COMMERCIAL

## 2025-03-04 VITALS
TEMPERATURE: 97.6 F | SYSTOLIC BLOOD PRESSURE: 112 MMHG | HEART RATE: 73 BPM | BODY MASS INDEX: 28.34 KG/M2 | WEIGHT: 154 LBS | HEIGHT: 62 IN | DIASTOLIC BLOOD PRESSURE: 74 MMHG | RESPIRATION RATE: 17 BRPM | OXYGEN SATURATION: 100 %

## 2025-03-04 DIAGNOSIS — J45.909 UNSPECIFIED ASTHMA, UNCOMPLICATED: ICD-10-CM

## 2025-03-04 DIAGNOSIS — Z87.891 PERSONAL HISTORY OF NICOTINE DEPENDENCE: ICD-10-CM

## 2025-03-04 DIAGNOSIS — Z87.898 PERSONAL HISTORY OF OTHER SPECIFIED CONDITIONS: ICD-10-CM

## 2025-03-04 DIAGNOSIS — Z00.00 ENCOUNTER FOR GENERAL ADULT MEDICAL EXAMINATION W/OUT ABNORMAL FINDINGS: ICD-10-CM

## 2025-03-04 DIAGNOSIS — Z12.11 ENCOUNTER FOR SCREENING FOR MALIGNANT NEOPLASM OF COLON: ICD-10-CM

## 2025-03-04 DIAGNOSIS — R14.0 ABDOMINAL DISTENSION (GASEOUS): ICD-10-CM

## 2025-03-04 DIAGNOSIS — Z87.19 PERSONAL HISTORY OF OTHER DISEASES OF THE DIGESTIVE SYSTEM: ICD-10-CM

## 2025-03-04 PROCEDURE — 93000 ELECTROCARDIOGRAM COMPLETE: CPT

## 2025-03-04 PROCEDURE — 99386 PREV VISIT NEW AGE 40-64: CPT

## 2025-03-05 ENCOUNTER — TRANSCRIPTION ENCOUNTER (OUTPATIENT)
Age: 60
End: 2025-03-05

## 2025-03-05 DIAGNOSIS — R94.31 ABNORMAL ELECTROCARDIOGRAM [ECG] [EKG]: ICD-10-CM

## 2025-03-06 LAB
APPEARANCE: CLEAR
BACTERIA UR CULT: NORMAL
BACTERIA: NEGATIVE /HPF
BILIRUBIN URINE: NEGATIVE
BLOOD URINE: NEGATIVE
CAST: 0 /LPF
COLOR: YELLOW
EPITHELIAL CELLS: 1 /HPF
GLUCOSE QUALITATIVE U: NEGATIVE MG/DL
KETONES URINE: NEGATIVE MG/DL
LEUKOCYTE ESTERASE URINE: ABNORMAL
MICROSCOPIC-UA: NORMAL
NITRITE URINE: NEGATIVE
PH URINE: 6.5
PROTEIN URINE: NEGATIVE MG/DL
RED BLOOD CELLS URINE: 1 /HPF
SPECIFIC GRAVITY URINE: 1.02
UROBILINOGEN URINE: 1 MG/DL
WHITE BLOOD CELLS URINE: 0 /HPF

## 2025-03-10 ENCOUNTER — APPOINTMENT (OUTPATIENT)
Dept: ENDOCRINOLOGY | Facility: CLINIC | Age: 60
End: 2025-03-10
Payer: COMMERCIAL

## 2025-03-10 VITALS
BODY MASS INDEX: 28.34 KG/M2 | SYSTOLIC BLOOD PRESSURE: 111 MMHG | HEIGHT: 62 IN | DIASTOLIC BLOOD PRESSURE: 72 MMHG | HEART RATE: 86 BPM | WEIGHT: 154 LBS | OXYGEN SATURATION: 97 %

## 2025-03-10 DIAGNOSIS — M81.0 AGE-RELATED OSTEOPOROSIS W/OUT CURRENT PATHOLOGICAL FRACTURE: ICD-10-CM

## 2025-03-10 DIAGNOSIS — E03.9 HYPOTHYROIDISM, UNSPECIFIED: ICD-10-CM

## 2025-03-10 PROCEDURE — 99214 OFFICE O/P EST MOD 30 MIN: CPT

## 2025-03-18 ENCOUNTER — APPOINTMENT (OUTPATIENT)
Dept: CARDIOLOGY | Facility: CLINIC | Age: 60
End: 2025-03-18
Payer: COMMERCIAL

## 2025-03-18 VITALS
DIASTOLIC BLOOD PRESSURE: 80 MMHG | SYSTOLIC BLOOD PRESSURE: 120 MMHG | HEIGHT: 62 IN | WEIGHT: 156 LBS | OXYGEN SATURATION: 99 % | HEART RATE: 91 BPM | BODY MASS INDEX: 28.71 KG/M2

## 2025-03-18 DIAGNOSIS — Z01.810 ENCOUNTER FOR PREPROCEDURAL CARDIOVASCULAR EXAMINATION: ICD-10-CM

## 2025-03-18 DIAGNOSIS — R94.31 ABNORMAL ELECTROCARDIOGRAM [ECG] [EKG]: ICD-10-CM

## 2025-03-18 DIAGNOSIS — Z78.9 OTHER SPECIFIED HEALTH STATUS: ICD-10-CM

## 2025-03-18 PROCEDURE — 99204 OFFICE O/P NEW MOD 45 MIN: CPT

## 2025-03-18 PROCEDURE — 93000 ELECTROCARDIOGRAM COMPLETE: CPT

## 2025-03-25 ENCOUNTER — TRANSCRIPTION ENCOUNTER (OUTPATIENT)
Age: 60
End: 2025-03-25

## 2025-03-31 RX ORDER — ALENDRONATE SODIUM 70 MG/1
70 TABLET ORAL
Qty: 12 | Refills: 3 | Status: ACTIVE | COMMUNITY
Start: 2025-03-31 | End: 1900-01-01

## 2025-04-08 ENCOUNTER — APPOINTMENT (OUTPATIENT)
Dept: BARIATRICS/WEIGHT MGMT | Facility: CLINIC | Age: 60
End: 2025-04-08
Payer: COMMERCIAL

## 2025-04-08 ENCOUNTER — OUTPATIENT (OUTPATIENT)
Dept: OUTPATIENT SERVICES | Facility: HOSPITAL | Age: 60
LOS: 1 days | End: 2025-04-08

## 2025-04-08 VITALS — BODY MASS INDEX: 28.52 KG/M2 | WEIGHT: 155 LBS | HEIGHT: 62 IN

## 2025-04-08 DIAGNOSIS — Z98.89 OTHER SPECIFIED POSTPROCEDURAL STATES: Chronic | ICD-10-CM

## 2025-04-08 DIAGNOSIS — Z98.84 BARIATRIC SURGERY STATUS: ICD-10-CM

## 2025-04-08 DIAGNOSIS — Z98.51 TUBAL LIGATION STATUS: Chronic | ICD-10-CM

## 2025-04-08 DIAGNOSIS — E66.813 OBESITY, CLASS 3: ICD-10-CM

## 2025-04-08 DIAGNOSIS — Z98.84 BARIATRIC SURGERY STATUS: Chronic | ICD-10-CM

## 2025-04-08 PROCEDURE — 99214 OFFICE O/P EST MOD 30 MIN: CPT | Mod: 95

## 2025-04-09 DIAGNOSIS — I10 ESSENTIAL (PRIMARY) HYPERTENSION: ICD-10-CM

## 2025-04-15 ENCOUNTER — LABORATORY RESULT (OUTPATIENT)
Age: 60
End: 2025-04-15

## 2025-04-15 ENCOUNTER — APPOINTMENT (OUTPATIENT)
Dept: INTERNAL MEDICINE | Facility: CLINIC | Age: 60
End: 2025-04-15

## 2025-04-15 VITALS
HEIGHT: 62 IN | BODY MASS INDEX: 28.52 KG/M2 | HEART RATE: 80 BPM | SYSTOLIC BLOOD PRESSURE: 124 MMHG | TEMPERATURE: 97.8 F | RESPIRATION RATE: 17 BRPM | DIASTOLIC BLOOD PRESSURE: 76 MMHG | WEIGHT: 155 LBS | OXYGEN SATURATION: 99 %

## 2025-04-15 DIAGNOSIS — Z12.11 ENCOUNTER FOR SCREENING FOR MALIGNANT NEOPLASM OF COLON: ICD-10-CM

## 2025-04-15 DIAGNOSIS — Z01.818 ENCOUNTER FOR OTHER PREPROCEDURAL EXAMINATION: ICD-10-CM

## 2025-04-15 PROCEDURE — 99214 OFFICE O/P EST MOD 30 MIN: CPT

## 2025-04-15 PROCEDURE — G2211 COMPLEX E/M VISIT ADD ON: CPT

## 2025-04-16 DIAGNOSIS — E66.813 OBESITY, CLASS 3: ICD-10-CM

## 2025-04-16 DIAGNOSIS — Z98.84 BARIATRIC SURGERY STATUS: ICD-10-CM

## 2025-04-17 ENCOUNTER — TRANSCRIPTION ENCOUNTER (OUTPATIENT)
Age: 60
End: 2025-04-17

## 2025-04-17 LAB
ABORH: NORMAL
ALBUMIN SERPL ELPH-MCNC: 4.3 G/DL
ALP BLD-CCNC: 69 U/L
ALT SERPL-CCNC: 14 U/L
ANION GAP SERPL CALC-SCNC: 10 MMOL/L
APPEARANCE: ABNORMAL
APTT BLD: 33.2 SEC
AST SERPL-CCNC: 20 U/L
BASOPHILS # BLD AUTO: 0.03 K/UL
BASOPHILS NFR BLD AUTO: 0.5 %
BILIRUB SERPL-MCNC: 0.5 MG/DL
BILIRUBIN URINE: NEGATIVE
BLOOD URINE: NEGATIVE
BUN SERPL-MCNC: 15 MG/DL
CALCIUM SERPL-MCNC: 9.5 MG/DL
CHLORIDE SERPL-SCNC: 106 MMOL/L
CO2 SERPL-SCNC: 25 MMOL/L
COLOR: YELLOW
CREAT SERPL-MCNC: 0.64 MG/DL
EGFRCR SERPLBLD CKD-EPI 2021: 103 ML/MIN/1.73M2
EOSINOPHIL # BLD AUTO: 0.05 K/UL
EOSINOPHIL NFR BLD AUTO: 0.8 %
GLUCOSE QUALITATIVE U: NEGATIVE MG/DL
GLUCOSE SERPL-MCNC: 81 MG/DL
HCT VFR BLD CALC: 40.1 %
HGB BLD-MCNC: 12.9 G/DL
IMM GRANULOCYTES NFR BLD AUTO: 0.2 %
INR PPP: 0.97 RATIO
KETONES URINE: NEGATIVE MG/DL
LEUKOCYTE ESTERASE URINE: NEGATIVE
LYMPHOCYTES # BLD AUTO: 1.55 K/UL
LYMPHOCYTES NFR BLD AUTO: 24.6 %
MAN DIFF?: NORMAL
MCHC RBC-ENTMCNC: 29.7 PG
MCHC RBC-ENTMCNC: 32.2 G/DL
MCV RBC AUTO: 92.2 FL
MONOCYTES # BLD AUTO: 0.64 K/UL
MONOCYTES NFR BLD AUTO: 10.1 %
NEUTROPHILS # BLD AUTO: 4.03 K/UL
NEUTROPHILS NFR BLD AUTO: 63.8 %
NITRITE URINE: NEGATIVE
PH URINE: 7
PLATELET # BLD AUTO: 341 K/UL
POTASSIUM SERPL-SCNC: 5.2 MMOL/L
PROT SERPL-MCNC: 7.3 G/DL
PROTEIN URINE: NEGATIVE MG/DL
PT BLD: 11.5 SEC
RBC # BLD: 4.35 M/UL
RBC # FLD: 13 %
SODIUM SERPL-SCNC: 141 MMOL/L
SPECIFIC GRAVITY URINE: 1.02
UROBILINOGEN URINE: 0.2 MG/DL
WBC # FLD AUTO: 6.31 K/UL

## 2025-05-09 ENCOUNTER — TRANSCRIPTION ENCOUNTER (OUTPATIENT)
Age: 60
End: 2025-05-09

## 2025-07-01 ENCOUNTER — OUTPATIENT (OUTPATIENT)
Dept: OUTPATIENT SERVICES | Facility: HOSPITAL | Age: 60
LOS: 1 days | End: 2025-07-01

## 2025-07-01 ENCOUNTER — APPOINTMENT (OUTPATIENT)
Dept: BARIATRICS/WEIGHT MGMT | Facility: CLINIC | Age: 60
End: 2025-07-01
Payer: COMMERCIAL

## 2025-07-01 VITALS — BODY MASS INDEX: 27.6 KG/M2 | HEIGHT: 62 IN | WEIGHT: 150 LBS

## 2025-07-01 DIAGNOSIS — Z98.89 OTHER SPECIFIED POSTPROCEDURAL STATES: Chronic | ICD-10-CM

## 2025-07-01 DIAGNOSIS — I10 ESSENTIAL (PRIMARY) HYPERTENSION: ICD-10-CM

## 2025-07-01 DIAGNOSIS — Z98.51 TUBAL LIGATION STATUS: Chronic | ICD-10-CM

## 2025-07-01 PROCEDURE — 99214 OFFICE O/P EST MOD 30 MIN: CPT | Mod: 95

## 2025-07-01 PROCEDURE — G2211 COMPLEX E/M VISIT ADD ON: CPT | Mod: 95

## 2025-07-08 DIAGNOSIS — E66.813 OBESITY, CLASS 3: ICD-10-CM

## 2025-07-08 DIAGNOSIS — Z98.84 BARIATRIC SURGERY STATUS: ICD-10-CM

## 2025-07-11 ENCOUNTER — TRANSCRIPTION ENCOUNTER (OUTPATIENT)
Age: 60
End: 2025-07-11

## 2025-07-21 ENCOUNTER — TRANSCRIPTION ENCOUNTER (OUTPATIENT)
Age: 60
End: 2025-07-21

## 2025-07-25 ENCOUNTER — TRANSCRIPTION ENCOUNTER (OUTPATIENT)
Age: 60
End: 2025-07-25

## 2025-07-29 ENCOUNTER — TRANSCRIPTION ENCOUNTER (OUTPATIENT)
Age: 60
End: 2025-07-29

## 2025-07-30 ENCOUNTER — TRANSCRIPTION ENCOUNTER (OUTPATIENT)
Age: 60
End: 2025-07-30

## 2025-07-31 ENCOUNTER — TRANSCRIPTION ENCOUNTER (OUTPATIENT)
Age: 60
End: 2025-07-31